# Patient Record
Sex: MALE | Race: WHITE | NOT HISPANIC OR LATINO | Employment: FULL TIME | URBAN - METROPOLITAN AREA
[De-identification: names, ages, dates, MRNs, and addresses within clinical notes are randomized per-mention and may not be internally consistent; named-entity substitution may affect disease eponyms.]

---

## 2017-09-14 ENCOUNTER — OFFICE VISIT (OUTPATIENT)
Dept: URGENT CARE | Facility: CLINIC | Age: 57
End: 2017-09-14
Payer: COMMERCIAL

## 2017-09-14 VITALS
HEART RATE: 74 BPM | TEMPERATURE: 97.5 F | BODY MASS INDEX: 21.66 KG/M2 | RESPIRATION RATE: 16 BRPM | HEIGHT: 69 IN | OXYGEN SATURATION: 96 % | WEIGHT: 146.2 LBS | DIASTOLIC BLOOD PRESSURE: 78 MMHG | SYSTOLIC BLOOD PRESSURE: 112 MMHG

## 2017-09-14 DIAGNOSIS — H92.03 OTALGIA, BILATERAL: ICD-10-CM

## 2017-09-14 DIAGNOSIS — H66.003 ACUTE SUPPURATIVE OTITIS MEDIA OF BOTH EARS WITHOUT SPONTANEOUS RUPTURE OF TYMPANIC MEMBRANES, RECURRENCE NOT SPECIFIED: ICD-10-CM

## 2017-09-14 DIAGNOSIS — H93.13 TINNITUS, BILATERAL: ICD-10-CM

## 2017-09-14 DIAGNOSIS — J01.10 SUBACUTE FRONTAL SINUSITIS: ICD-10-CM

## 2017-09-14 PROCEDURE — 99203 OFFICE O/P NEW LOW 30 MIN: CPT | Performed by: NURSE PRACTITIONER

## 2017-09-14 RX ORDER — AMOXICILLIN AND CLAVULANATE POTASSIUM 875; 125 MG/1; MG/1
1 TABLET, FILM COATED ORAL 2 TIMES DAILY
Qty: 10 TAB | Refills: 0 | Status: SHIPPED | OUTPATIENT
Start: 2017-09-14 | End: 2017-09-19

## 2017-09-14 RX ORDER — DEXLANSOPRAZOLE 60 MG/1
1 CAPSULE, DELAYED RELEASE ORAL
Refills: 11 | COMMUNITY
Start: 2017-08-27 | End: 2018-07-23 | Stop reason: SDUPTHER

## 2017-09-14 ASSESSMENT — ENCOUNTER SYMPTOMS
HEADACHES: 1
DIAPHORESIS: 0
WEAKNESS: 0
FEVER: 0
SORE THROAT: 0
PALPITATIONS: 0
SHORTNESS OF BREATH: 0
ORTHOPNEA: 0
COUGH: 0
CHILLS: 0

## 2017-09-14 NOTE — PROGRESS NOTES
"Subjective:      Deandre Barr is a 57 y.o. male who presents with Otalgia (bilateral ear pain X 2 weeks )            Patient comes in today with a 2 week history of bilateral otalgia.  He notes worse symptoms during nighttime.  Associated symptoms include a waxing and waning headache with sinus pressure in the frontal region.  No fever, chills, nasal congestion, sore throat, or cough.  No dizziness.  He has baseline tinnitus for many years.  He is not taking any medication to treat the symptoms.  He is a current smoker with approximately 45 pack year history.  He is not interested in smoking cessation.        Review of Systems   Constitutional: Negative for chills, diaphoresis, fever and malaise/fatigue.   HENT: Positive for ear pain and tinnitus. Negative for congestion, ear discharge, hearing loss and sore throat.    Respiratory: Negative for cough and shortness of breath.    Cardiovascular: Negative for chest pain, palpitations, orthopnea and leg swelling.   Skin: Negative for rash.   Neurological: Positive for headaches. Negative for weakness.     Medications, Allergies, and current problem list reviewed today in Epic     Objective:     /78   Pulse 74   Temp 36.4 °C (97.5 °F)   Resp 16   Ht 1.753 m (5' 9\")   Wt 66.3 kg (146 lb 3.2 oz)   SpO2 96%   BMI 21.59 kg/m²      Physical Exam   Constitutional: He is oriented to person, place, and time. He appears well-developed and well-nourished. No distress.   HENT:   Head: Normocephalic.   Nose: Nose normal.   Mouth/Throat: Oropharynx is clear and moist. No oropharyngeal exudate.   Patient reports generalized frontal headache with no sinus TTP.  Nares patent.  Bilateral TM effusion with milky fluid and slight bulge.  NO erythema.  No pre- or post-auricular adenopathy.  No mastoid swelling or TTP.   Eyes: Conjunctivae are normal. Pupils are equal, round, and reactive to light. Right eye exhibits no discharge. Left eye exhibits no discharge. No scleral " icterus.   Neck: Neck supple. No JVD present. No tracheal deviation present. No thyromegaly present.   Cardiovascular: Normal rate, regular rhythm and normal heart sounds.  Exam reveals no gallop and no friction rub.    No murmur heard.  Pulmonary/Chest: Effort normal and breath sounds normal. No stridor. No respiratory distress. He has no wheezes. He has no rales. He exhibits no tenderness.   Musculoskeletal: He exhibits no edema.   Lymphadenopathy:     He has no cervical adenopathy.   Neurological: He is alert and oriented to person, place, and time.   Skin: Skin is warm and dry. He is not diaphoretic. No erythema. No pallor.   Vitals reviewed.              Assessment/Plan:     1. Acute suppurative otitis media of both ears without spontaneous rupture of tympanic membranes, recurrence not specified  - amoxicillin-clavulanate (AUGMENTIN) 875-125 MG Tab; Take 1 Tab by mouth 2 times a day for 5 days.  Dispense: 10 Tab; Refill: 0    2. Subacute frontal sinusitis    3. Otalgia, bilateral    4. Tinnitus, bilateral    Take full course of antibiotics.  OTC NSAIDs or tylenol prn fever, pain.  Encouraged smoking cessation.  Maintain adequate po hydration.  RTC in 5-7 days if symptoms persist, sooner if worse.  If symptoms persist, patient may also contact me for referral to ENT.  Establish with ENT for routine health care, screening, and maintenance.  Patient verbalized understanding of and agreed with plan of care.

## 2018-01-15 ENCOUNTER — OFFICE VISIT (OUTPATIENT)
Dept: URGENT CARE | Facility: CLINIC | Age: 58
End: 2018-01-15
Payer: COMMERCIAL

## 2018-01-15 VITALS
TEMPERATURE: 98.5 F | BODY MASS INDEX: 21.62 KG/M2 | RESPIRATION RATE: 16 BRPM | SYSTOLIC BLOOD PRESSURE: 128 MMHG | OXYGEN SATURATION: 97 % | WEIGHT: 146 LBS | HEART RATE: 78 BPM | HEIGHT: 69 IN | DIASTOLIC BLOOD PRESSURE: 72 MMHG

## 2018-01-15 DIAGNOSIS — K04.7 DENTAL ABSCESS: ICD-10-CM

## 2018-01-15 PROCEDURE — 99214 OFFICE O/P EST MOD 30 MIN: CPT | Performed by: PHYSICIAN ASSISTANT

## 2018-01-15 RX ORDER — AMOXICILLIN AND CLAVULANATE POTASSIUM 875; 125 MG/1; MG/1
1 TABLET, FILM COATED ORAL 2 TIMES DAILY
Qty: 20 TAB | Refills: 0 | Status: SHIPPED | OUTPATIENT
Start: 2018-01-15 | End: 2018-03-09

## 2018-01-15 ASSESSMENT — ENCOUNTER SYMPTOMS
CARDIOVASCULAR NEGATIVE: 1
GASTROINTESTINAL NEGATIVE: 1
CHILLS: 0
RESPIRATORY NEGATIVE: 1
SINUS PRESSURE: 0
FEVER: 0

## 2018-01-15 NOTE — PROGRESS NOTES
"Subjective:      Deandre Barr is a 57 y.o. male who presents with Oral Swelling (right upper dental swelling since yesterday )            Dental Pain    This is a new problem. The current episode started yesterday. The problem occurs constantly. The problem has been gradually worsening. The pain is at a severity of 7/10. The pain is moderate. Pertinent negatives include no difficulty swallowing, facial pain, fever, sinus pressure or thermal sensitivity. He has tried nothing for the symptoms. The treatment provided no relief.       PMH:  has no past medical history on file.  MEDS:   Current Outpatient Prescriptions:   •  amoxicillin-clavulanate (AUGMENTIN) 875-125 MG Tab, Take 1 Tab by mouth 2 times a day., Disp: 20 Tab, Rfl: 0  •  DEXILANT 60 MG CAPSULE DELAYED RELEASE delayed-release capsule, Take 1 Cap by mouth every day., Disp: , Rfl: 11  ALLERGIES: No Known Allergies  SURGHX: History reviewed. No pertinent surgical history.  SOCHX:  reports that he has been smoking.  He has a 45.00 pack-year smoking history. He has quit using smokeless tobacco.  FH: family history is not on file.      Review of Systems   Constitutional: Negative for chills and fever.   HENT: Negative.  Negative for sinus pressure.    Respiratory: Negative.    Cardiovascular: Negative.    Gastrointestinal: Negative.        Medications, Allergies, and current problem list reviewed today in Epic     Objective:     /72   Pulse 78   Temp 36.9 °C (98.5 °F)   Resp 16   Ht 1.753 m (5' 9\")   Wt 66.2 kg (146 lb)   SpO2 97%   BMI 21.56 kg/m²      Physical Exam   Constitutional: He is oriented to person, place, and time. He appears well-developed and well-nourished. No distress.   HENT:   Head: Normocephalic and atraumatic.   Right Ear: External ear normal.   Left Ear: External ear normal.   Mouth/Throat: Uvula is midline, oropharynx is clear and moist and mucous membranes are normal. Oral lesions present. Dental abscesses present. No " uvula swelling or dental caries. No oropharyngeal exudate.       Eyes: Conjunctivae are normal. Right eye exhibits no discharge. Left eye exhibits no discharge.   Neck: Normal range of motion. Neck supple.   Cardiovascular: Normal rate, regular rhythm and normal heart sounds.    No murmur heard.  Pulmonary/Chest: Effort normal and breath sounds normal. No respiratory distress. He has no wheezes.   Lymphadenopathy:     He has no cervical adenopathy.   Neurological: He is alert and oriented to person, place, and time.   Skin: Skin is warm and dry. He is not diaphoretic.   Psychiatric: He has a normal mood and affect. His behavior is normal. Judgment and thought content normal.   Nursing note and vitals reviewed.              Assessment/Plan:     1. Dental abscess  amoxicillin-clavulanate (AUGMENTIN) 875-125 MG Tab     Take full course of antibiotic  Follow-up with dentistry next week  OTC meds and conservative measures as discussed  Return to clinic or go to ED if symptoms worsen or persist. Indications for ED discussed at length. Patient voices understanding. Follow-up with your primary care provider in 3-5 days. Red flags discussed.    Please note that this dictation was created using voice recognition software. I have made every reasonable attempt to correct obvious errors, but I expect that there are errors of grammar and possibly content that I did not discover before finalizing the note.

## 2018-03-09 ENCOUNTER — OFFICE VISIT (OUTPATIENT)
Dept: URGENT CARE | Facility: CLINIC | Age: 58
End: 2018-03-09
Payer: COMMERCIAL

## 2018-03-09 VITALS
WEIGHT: 152.4 LBS | HEART RATE: 86 BPM | SYSTOLIC BLOOD PRESSURE: 130 MMHG | BODY MASS INDEX: 22.57 KG/M2 | OXYGEN SATURATION: 96 % | TEMPERATURE: 98.2 F | RESPIRATION RATE: 16 BRPM | HEIGHT: 69 IN | DIASTOLIC BLOOD PRESSURE: 84 MMHG

## 2018-03-09 DIAGNOSIS — K12.2 ORAL INFECTION: ICD-10-CM

## 2018-03-09 DIAGNOSIS — K08.89 TOOTH PAIN: ICD-10-CM

## 2018-03-09 PROCEDURE — 99214 OFFICE O/P EST MOD 30 MIN: CPT | Performed by: NURSE PRACTITIONER

## 2018-03-09 RX ORDER — AMOXICILLIN AND CLAVULANATE POTASSIUM 875; 125 MG/1; MG/1
1 TABLET, FILM COATED ORAL 2 TIMES DAILY
Qty: 14 TAB | Refills: 0 | Status: SHIPPED | OUTPATIENT
Start: 2018-03-09 | End: 2018-03-16

## 2018-03-09 ASSESSMENT — ENCOUNTER SYMPTOMS
CHILLS: 0
SORE THROAT: 0
DIAPHORESIS: 0
FEVER: 0
WEAKNESS: 0

## 2018-03-09 NOTE — PROGRESS NOTES
"Subjective:      Deandre Barr is a 58 y.o. male who presents with Dental Pain (right lower dental pain X 2 days )            Patient comes in today with a 2 day history of tooth pain in the lower left molar region.  He reports he has a \"bad tooth\" at that site and knows he needs to have it pulled.  He is waiting for his girlfriend to come up from [place] to drive him after sedation.  He had a similar occurrence on the upper right jaw 2 months ago and had that tooth pulled after finishing his antibiotics.  No fever, chills, facial swelling, or purulence.          Review of Systems   Constitutional: Negative for chills, diaphoresis, fever and malaise/fatigue.   HENT: Negative for sore throat.    Neurological: Negative for weakness.     Medications, Allergies, and current problem list reviewed today in Epic     Objective:     /84   Pulse 86   Temp 36.8 °C (98.2 °F)   Resp 16   Ht 1.753 m (5' 9\")   Wt 69.1 kg (152 lb 6.4 oz)   SpO2 96%   BMI 22.51 kg/m²      Physical Exam   Constitutional: He is oriented to person, place, and time. He appears well-developed and well-nourished. No distress.   HENT:   Mouth/Throat:       Focal TTP of molar on the bottom left; tooth has noted decay at the gum line.  Very tender to touch.  No loose or broken teeth.  No gum swelling, erythema, or purulence.     Cardiovascular: Normal rate.    Pulmonary/Chest: Effort normal.   Neurological: He is alert and oriented to person, place, and time.   Skin: He is not diaphoretic.   Vitals reviewed.              Assessment/Plan:     1. Oral infection  - amoxicillin-clavulanate (AUGMENTIN) 875-125 MG Tab; Take 1 Tab by mouth 2 times a day for 7 days.  Dispense: 14 Tab; Refill: 0    2. Tooth pain    Discussed exam findings with patient.  Infection versus inflammation.  Recommended consult and care with dentist at first availability.    Take full course of antibiotics.  OTC NSAIDs or tylenol prn pain.  Maintain adequate po " hydration.  ED precautions reviewed.  Encouraged smoking cessation.  Patient verbalized understanding of and agreed with plan of care.

## 2018-07-23 ENCOUNTER — OFFICE VISIT (OUTPATIENT)
Dept: MEDICAL GROUP | Facility: PHYSICIAN GROUP | Age: 58
End: 2018-07-23
Payer: COMMERCIAL

## 2018-07-23 VITALS
OXYGEN SATURATION: 96 % | DIASTOLIC BLOOD PRESSURE: 62 MMHG | HEIGHT: 69 IN | WEIGHT: 149 LBS | TEMPERATURE: 98.6 F | BODY MASS INDEX: 22.07 KG/M2 | RESPIRATION RATE: 16 BRPM | SYSTOLIC BLOOD PRESSURE: 112 MMHG | HEART RATE: 80 BPM

## 2018-07-23 DIAGNOSIS — Z87.19 H/O GASTROESOPHAGEAL REFLUX (GERD): ICD-10-CM

## 2018-07-23 PROCEDURE — 99212 OFFICE O/P EST SF 10 MIN: CPT | Performed by: NURSE PRACTITIONER

## 2018-07-23 RX ORDER — DEXLANSOPRAZOLE 60 MG/1
1 CAPSULE, DELAYED RELEASE ORAL
Qty: 90 CAP | Refills: 0 | Status: SHIPPED | OUTPATIENT
Start: 2018-07-23 | End: 2018-10-23 | Stop reason: SDUPTHER

## 2018-07-23 ASSESSMENT — PATIENT HEALTH QUESTIONNAIRE - PHQ9: CLINICAL INTERPRETATION OF PHQ2 SCORE: 0

## 2018-07-24 NOTE — PROGRESS NOTES
"Chief Complaint   Patient presents with   • Gastrophageal Reflux     refill dexilant         This is a 58 y.o.male patient that presents today with the following: Refill on Dexilant until he can be established with a new provider    H/O gastroesophageal reflux (GERD)  Pt has history of GERD, this is stable and well controlled with Dexilant 60 mg daily. He tolerates medication well. He states that he tries to avoid aggravating foods and activities. He will be establishing care with new provider, but in the interim, needs a refill. This was called in for him, advised to take on empty stomach      No visits with results within 1 Month(s) from this visit.   Latest known visit with results is:   No results found for any previous visit.         clinical course has been stable    History reviewed. No pertinent past medical history.    History reviewed. No pertinent surgical history.    History reviewed. No pertinent family history.    Patient has no known allergies.    Current Outpatient Prescriptions Ordered in [employer]   Medication Sig Dispense Refill   • DEXILANT 60 MG CAPSULE DELAYED RELEASE delayed-release capsule Take 1 Cap by mouth every day. 90 Cap 0     No current Epic-ordered facility-administered medications on file.        Constitutional ROS: No unexpected change in weight, No weakness, No unexplained fevers, sweats, or chills  Pulmonary ROS: No chronic cough, sputum, or hemoptysis, No shortness of breath, No recent change in breathing, Positive for smoker   Cardiovascular ROS: No chest pain, No edema, No palpitations  Gastrointestinal ROS: Positive per HPI  Musculoskeletal/Extremities ROS: No clubbing, No peripheral edema, No pain, redness or swelling on the joints  Neurologic ROS: Normal development, No seizures, No weakness    Physical exam:  /62   Pulse 80   Temp 37 °C (98.6 °F)   Resp 16   Ht 1.753 m (5' 9\")   Wt 67.6 kg (149 lb)   SpO2 96%   BMI 22.00 kg/m²   General Appearance: Middle-aged male, " alert, no distress, well-nourished, well-groomed  Skin: Skin color, texture, turgor normal. No rashes or lesions.  Lungs: negative findings: normal respiratory rate and rhythm, lungs clear to auscultation  Heart: negative. RRR without murmur, gallop, or rubs.  No ectopy.  Abdomen: Abdomen soft, non-tender. BS normal. No masses,  No organomegaly  Musculoskeletal: negative findings: ROM of all joints is normal, no evidence of muscle atrophy, no deformities present  Neurologic: intact, oriented, CN II through XII grossly intact    Medical decision making/discussion: Patient has been referred to gastroenterology per his request and his Dexilant has been refilled and he is to take this as prescribed, first thing in the morning on an empty stomach.  He is to follow-up as needed.    Deandre was seen today for gastrophageal reflux.    Diagnoses and all orders for this visit:    H/O gastroesophageal reflux (GERD)  -     DEXILANT 60 MG CAPSULE DELAYED RELEASE delayed-release capsule; Take 1 Cap by mouth every day.  -     REFERRAL TO GASTROENTEROLOGY          Please note that this dictation was created using voice recognition software. I have made every reasonable attempt to correct obvious errors, but I expect that there are errors of grammar and possibly content that I did not discover before finalizing the note.

## 2018-07-24 NOTE — ASSESSMENT & PLAN NOTE
Pt has history of GERD, this is stable and well controlled with Dexilant 60 mg daily. He tolerates medication well. He states that he tries to avoid aggravating foods and activities. He will be establishing care with new provider, but in the interim, needs a refill. This was called in for him, advised to take on empty stomach

## 2018-10-23 DIAGNOSIS — Z87.19 H/O GASTROESOPHAGEAL REFLUX (GERD): ICD-10-CM

## 2018-10-23 RX ORDER — DEXLANSOPRAZOLE 60 MG/1
CAPSULE, DELAYED RELEASE ORAL
Qty: 90 CAP | Refills: 2 | Status: SHIPPED | OUTPATIENT
Start: 2018-10-23 | End: 2019-06-12 | Stop reason: SDUPTHER

## 2019-03-13 ENCOUNTER — OFFICE VISIT (OUTPATIENT)
Dept: URGENT CARE | Facility: PHYSICIAN GROUP | Age: 59
End: 2019-03-13
Payer: COMMERCIAL

## 2019-03-13 VITALS
DIASTOLIC BLOOD PRESSURE: 80 MMHG | OXYGEN SATURATION: 97 % | SYSTOLIC BLOOD PRESSURE: 134 MMHG | RESPIRATION RATE: 20 BRPM | TEMPERATURE: 98.4 F | HEART RATE: 80 BPM | WEIGHT: 156 LBS | HEIGHT: 69 IN | BODY MASS INDEX: 23.11 KG/M2

## 2019-03-13 DIAGNOSIS — K04.7 DENTAL INFECTION: ICD-10-CM

## 2019-03-13 PROCEDURE — 99214 OFFICE O/P EST MOD 30 MIN: CPT | Performed by: FAMILY MEDICINE

## 2019-03-13 RX ORDER — AMOXICILLIN AND CLAVULANATE POTASSIUM 875; 125 MG/1; MG/1
TABLET, FILM COATED ORAL
Qty: 20 TAB | Refills: 0 | Status: SHIPPED | OUTPATIENT
Start: 2019-03-13 | End: 2019-09-10

## 2019-03-14 NOTE — PROGRESS NOTES
Chief Complaint:    Chief Complaint   Patient presents with   • Dental Injury     left side bottom tooth pain/ swelling/infection x1day        History of Present Illness:    This is a new problem. He has pain left lower tooth x 1 day. He has had problem with that tooth in past and was supposed to get it fixed, but never did, and it flared today. Augmentin has worked/tolerated for similar previous problem in other teeth that he did get fixed.      Review of Systems:    Constitutional: Negative for fever, chills, and diaphoresis.   Eyes: Negative for change in vision, photophobia, pain, redness, and discharge.  ENT: See HPI.  Respiratory: Negative for cough, hemoptysis, sputum production, shortness of breath, wheezing, and stridor.    Cardiovascular: Negative for chest pain, palpitations, orthopnea, claudication, leg swelling, and PND.   Gastrointestinal: Negative for abdominal pain, nausea, vomiting, diarrhea, constipation, blood in stool, and melena.   Genitourinary: Negative for dysuria, urinary urgency, urinary frequency, hematuria, and flank pain.   Musculoskeletal: Negative for myalgias, joint pain, neck pain, and back pain.   Skin: Negative for rash and itching.   Neurological: Negative for dizziness, tingling, tremors, sensory change, speech change, focal weakness, seizures, loss of consciousness, and headaches.   Endo: Negative for polydipsia.   Heme: Does not bruise/bleed easily.   Psychiatric/Behavioral: Negative for depression, suicidal ideas, hallucinations, memory loss and substance abuse. The patient is not nervous/anxious and does not have insomnia.        Past Medical History:    History reviewed. No pertinent past medical history.    Past Surgical History:    History reviewed. No pertinent surgical history.    Social History:    Social History     Social History   • Marital status: Single     Spouse name: N/A   • Number of children: N/A   • Years of education: N/A     Occupational History   • Not on  "file.     Social History Main Topics   • Smoking status: Current Every Day Smoker     Packs/day: 1.50     Years: 30.00   • Smokeless tobacco: Former User   • Alcohol use Not on file   • Drug use: Unknown   • Sexual activity: Not on file     Other Topics Concern   • Not on file     Social History Narrative   • No narrative on file     Family History:    History reviewed. No pertinent family history.    Medications:    Current Outpatient Prescriptions on File Prior to Visit   Medication Sig Dispense Refill   • DEXILANT 60 MG CAPSULE DELAYED RELEASE delayed-release capsule TAKE 1 CAPSULE BY MOUTH EVERY DAY 90 Cap 2     No current facility-administered medications on file prior to visit.      Allergies:    No Known Allergies      Vitals:    Vitals:    03/13/19 1732   BP: 134/80   BP Location: Left arm   Patient Position: Sitting   BP Cuff Size: Small adult   Pulse: 80   Resp: 20   Temp: 36.9 °C (98.4 °F)   SpO2: 97%   Weight: 70.8 kg (156 lb)   Height: 1.753 m (5' 9\")       Physical Exam:    Constitutional: Vital signs reviewed. Appears well-developed and well-nourished. No acute distress.   Eyes: Sclera white, conjunctivae clear.   ENT: Left lower tooth is capped and tender to palpation. External ears normal. Hearing normal. Lips are normal. Oral mucosa pink and moist. Posterior pharynx: WNL.  Neck: Neck supple.   Pulmonary/Chest: Respirations non-labored.   Lymph: Cervical nodes without tenderness or enlargement.  Musculoskeletal: Normal gait. Normal range of motion. No muscular atrophy or weakness.  Neurological: Alert and oriented to person, place, and time. Muscle tone normal. Coordination normal.   Skin: No rashes or lesions. Warm, dry, normal turgor.  Psychiatric: Normal mood and affect. Behavior is normal. Judgment and thought content normal.       Assessment / Plan:    1. Dental infection  - amoxicillin-clavulanate (AUGMENTIN) 875-125 MG Tab; 1 TAB TWICE A DAY X 10 DAYS. TAKE WITH FOOD.  Dispense: 20 Tab; " Refill: 0      Discussed with him DDX, management options, and risks, benefits, and alternatives to treatment plan agreed upon.    Agreeable to medication prescribed.    Advised to see Dentist for definitive treatment.    Discussed expected course of duration, time for improvement, and to seek follow-up in Emergency Room, urgent care, or with PCP if getting worse at any time or not improving within expected time frame.

## 2019-06-12 DIAGNOSIS — Z87.19 H/O GASTROESOPHAGEAL REFLUX (GERD): ICD-10-CM

## 2019-06-14 RX ORDER — DEXLANSOPRAZOLE 60 MG/1
CAPSULE, DELAYED RELEASE ORAL
Qty: 90 CAP | Refills: 1 | Status: SHIPPED | OUTPATIENT
Start: 2019-06-14 | End: 2019-10-30 | Stop reason: SDUPTHER

## 2019-09-10 ENCOUNTER — TELEPHONE (OUTPATIENT)
Dept: SCHEDULING | Facility: IMAGING CENTER | Age: 59
End: 2019-09-10

## 2019-09-10 ENCOUNTER — HOSPITAL ENCOUNTER (EMERGENCY)
Facility: MEDICAL CENTER | Age: 59
End: 2019-09-10
Attending: EMERGENCY MEDICINE
Payer: COMMERCIAL

## 2019-09-10 VITALS
WEIGHT: 151.9 LBS | TEMPERATURE: 97.9 F | HEIGHT: 69 IN | OXYGEN SATURATION: 100 % | SYSTOLIC BLOOD PRESSURE: 162 MMHG | RESPIRATION RATE: 16 BRPM | DIASTOLIC BLOOD PRESSURE: 89 MMHG | BODY MASS INDEX: 22.5 KG/M2 | HEART RATE: 96 BPM

## 2019-09-10 DIAGNOSIS — R10.30 LOWER ABDOMINAL PAIN: ICD-10-CM

## 2019-09-10 LAB
ALBUMIN SERPL BCP-MCNC: 3.9 G/DL (ref 3.2–4.9)
ALBUMIN/GLOB SERPL: 1.3 G/DL
ALP SERPL-CCNC: 73 U/L (ref 30–99)
ALT SERPL-CCNC: 17 U/L (ref 2–50)
ANION GAP SERPL CALC-SCNC: 6 MMOL/L (ref 0–11.9)
APPEARANCE UR: CLEAR
AST SERPL-CCNC: 18 U/L (ref 12–45)
BASOPHILS # BLD AUTO: 0.4 % (ref 0–1.8)
BASOPHILS # BLD: 0.05 K/UL (ref 0–0.12)
BILIRUB SERPL-MCNC: 1 MG/DL (ref 0.1–1.5)
BILIRUB UR QL STRIP.AUTO: NEGATIVE
BUN SERPL-MCNC: 14 MG/DL (ref 8–22)
CALCIUM SERPL-MCNC: 8.8 MG/DL (ref 8.5–10.5)
CHLORIDE SERPL-SCNC: 107 MMOL/L (ref 96–112)
CO2 SERPL-SCNC: 25 MMOL/L (ref 20–33)
COLOR UR: YELLOW
CREAT SERPL-MCNC: 0.8 MG/DL (ref 0.5–1.4)
EOSINOPHIL # BLD AUTO: 0.09 K/UL (ref 0–0.51)
EOSINOPHIL NFR BLD: 0.7 % (ref 0–6.9)
ERYTHROCYTE [DISTWIDTH] IN BLOOD BY AUTOMATED COUNT: 43.9 FL (ref 35.9–50)
GLOBULIN SER CALC-MCNC: 2.9 G/DL (ref 1.9–3.5)
GLUCOSE SERPL-MCNC: 90 MG/DL (ref 65–99)
GLUCOSE UR STRIP.AUTO-MCNC: NEGATIVE MG/DL
HCT VFR BLD AUTO: 48 % (ref 42–52)
HGB BLD-MCNC: 16.3 G/DL (ref 14–18)
IMM GRANULOCYTES # BLD AUTO: 0.06 K/UL (ref 0–0.11)
IMM GRANULOCYTES NFR BLD AUTO: 0.5 % (ref 0–0.9)
KETONES UR STRIP.AUTO-MCNC: NEGATIVE MG/DL
LEUKOCYTE ESTERASE UR QL STRIP.AUTO: NEGATIVE
LIPASE SERPL-CCNC: 27 U/L (ref 11–82)
LYMPHOCYTES # BLD AUTO: 1.8 K/UL (ref 1–4.8)
LYMPHOCYTES NFR BLD: 14.5 % (ref 22–41)
MCH RBC QN AUTO: 33.3 PG (ref 27–33)
MCHC RBC AUTO-ENTMCNC: 34 G/DL (ref 33.7–35.3)
MCV RBC AUTO: 98 FL (ref 81.4–97.8)
MICRO URNS: NORMAL
MONOCYTES # BLD AUTO: 0.87 K/UL (ref 0–0.85)
MONOCYTES NFR BLD AUTO: 7 % (ref 0–13.4)
NEUTROPHILS # BLD AUTO: 9.53 K/UL (ref 1.82–7.42)
NEUTROPHILS NFR BLD: 76.9 % (ref 44–72)
NITRITE UR QL STRIP.AUTO: NEGATIVE
NRBC # BLD AUTO: 0 K/UL
NRBC BLD-RTO: 0 /100 WBC
PH UR STRIP.AUTO: 5.5 [PH] (ref 5–8)
PLATELET # BLD AUTO: 280 K/UL (ref 164–446)
PMV BLD AUTO: 9.3 FL (ref 9–12.9)
POTASSIUM SERPL-SCNC: 3.8 MMOL/L (ref 3.6–5.5)
PROT SERPL-MCNC: 6.8 G/DL (ref 6–8.2)
PROT UR QL STRIP: NEGATIVE MG/DL
RBC # BLD AUTO: 4.9 M/UL (ref 4.7–6.1)
RBC UR QL AUTO: NEGATIVE
SODIUM SERPL-SCNC: 138 MMOL/L (ref 135–145)
SP GR UR STRIP.AUTO: 1.01
UROBILINOGEN UR STRIP.AUTO-MCNC: 0.2 MG/DL
WBC # BLD AUTO: 12.4 K/UL (ref 4.8–10.8)

## 2019-09-10 PROCEDURE — 85025 COMPLETE CBC W/AUTO DIFF WBC: CPT

## 2019-09-10 PROCEDURE — 83690 ASSAY OF LIPASE: CPT

## 2019-09-10 PROCEDURE — 99284 EMERGENCY DEPT VISIT MOD MDM: CPT

## 2019-09-10 PROCEDURE — 81003 URINALYSIS AUTO W/O SCOPE: CPT

## 2019-09-10 PROCEDURE — 80053 COMPREHEN METABOLIC PANEL: CPT

## 2019-09-10 ASSESSMENT — ENCOUNTER SYMPTOMS
MUSCULOSKELETAL NEGATIVE: 1
EYES NEGATIVE: 1
NAUSEA: 0
VOMITING: 0
SHORTNESS OF BREATH: 0
NEUROLOGICAL NEGATIVE: 1
ABDOMINAL PAIN: 1
FEVER: 0
PSYCHIATRIC NEGATIVE: 1

## 2019-09-10 NOTE — ED TRIAGE NOTES
".  Chief Complaint   Patient presents with   • Abdominal Pain     lower abdominal pain     .BP (!) 162/89   Pulse 96   Temp 36.6 °C (97.9 °F) (Temporal)   Resp 16   Ht 1.753 m (5' 9\")   Wt 68.9 kg (151 lb 14.4 oz)   SpO2 100%   BMI 22.43 kg/m²     Ambulatory to triage with above complaints on waking this morning, denies N/V, educated on triage process, placed in lobby, told to inform staff of any changes in condition.    "

## 2019-09-10 NOTE — TELEPHONE ENCOUNTER
Phone Number Called: [phone] (home)     Call outcome: spoke to patient regarding message below    Message: Patient will be heading into ER not UC to be evaluated. Patient will schedule follow visit with PCP after he is evaluated. Thank you.

## 2019-09-10 NOTE — ED PROVIDER NOTES
"ED Provider Note    Scribed for Cadence Gonzalez D.O. by Raymon Prado. 9/10/2019, 11:33 AM.    Primary care provider: TABATHA Montero  Means of arrival: Walk-In  History obtained from: Patient  History limited by: None    CHIEF COMPLAINT  Chief Complaint   Patient presents with   • Abdominal Pain     lower abdominal pain       HPI  Deandre Barr is a 59 y.o. male who presents to the Emergency Department for evaluation of abdominal pain, onset this morning. This morning he states he was in severe abdominal pain, his pain has diminished since then stating that he is in no pain at present, but adds that it felt very similar to when he had kidney stones in the past. 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 Additional to this he notes his abdomen has been \"feeling weird\" for the past couple weeks. He notes associated \"yellow bile liquid stool\".  No urinary changes.  Previously when he has had kidney stones, he has had gross hematuria which she is not currently experiencing. He denies fever, nausea, emesis, testicular pain, hematuria, cough, or shortness of breath. For the past 15 years he notes eating has caused him discomfort.  And that he has had chronic GI symptoms.  He had extensive work-up in the past including colonoscopy but never been given a definitive diagnosis.  It has been sometime since he has had any GI work-up and he does not have an established care provider in this community.  The patient denies any abdominal surgery. At bedside he denies hypertension or diabetes. He endorses daily drinking and cigarette smoking.     REVIEW OF SYSTEMS  Review of Systems   Constitutional: Negative for fever.   HENT: Negative.    Eyes: Negative.    Respiratory: Negative for shortness of breath.    Cardiovascular: Negative for chest pain.   Gastrointestinal: Positive for abdominal pain. Negative for nausea and vomiting.   Genitourinary: Negative for hematuria.        " "Negative Testicular Pain   Musculoskeletal: Negative.    Skin: Negative.    Neurological: Negative.    Endo/Heme/Allergies: Negative.    Psychiatric/Behavioral: Negative.    All other systems reviewed and are negative.      PAST MEDICAL HISTORY   Chronic GI symptoms.  No other significant past medical history.    SURGICAL HISTORY  patient denies any surgical history    SOCIAL HISTORY  Social History     Tobacco Use   • Smoking status: Current Every Day Smoker     Packs/day: 1.50     Years: 30.00     Pack years: 45.00   • Smokeless tobacco: Former User   Substance Use Topics   • Alcohol use: Yes   • Drug use: Never      Social History     Substance and Sexual Activity   Drug Use Never       FAMILY HISTORY  History reviewed. No pertinent family history.    CURRENT MEDICATIONS  Home Medications     Reviewed by Tonja Nash R.N. (Registered Nurse) on 09/10/19 at 1044  Med List Status: Complete   Medication Last Dose Status   DEXILANT 60 MG CAPSULE DELAYED RELEASE delayed-release capsule 9/10/2019 Active                ALLERGIES  No Known Allergies    PHYSICAL EXAM  VITAL SIGNS: BP (!) 162/89   Pulse 96   Temp 36.6 °C (97.9 °F) (Temporal)   Resp 16   Ht 1.753 m (5' 9\")   Wt 68.9 kg (151 lb 14.4 oz)   SpO2 100%   BMI 22.43 kg/m²   Vitals reviewed.  Constitutional: Patient is oriented to person, place, and time. Appears well-developed and well-nourished. No distress.    Head: Normocephalic and atraumatic.   Ears: Normal external ears bilaterally.   Mouth/Throat: Oropharynx is clear and moist,   Eyes: Conjunctivae are normal. Pupils are equal, round, and reactive to light.   Cardiovascular: Normal rate, regular rhythm and normal heart sounds.  Pulmonary/Chest: Effort normal and breath sounds normal. No respiratory distress, no wheezes, rhonchi, or rales.  Abdominal: Soft. Bowel sounds are normal. There is no tenderness, rebound or guarding, or peritoneal signs, no masses. No CVA tenderness. No testicular " pain.  Musculoskeletal: No edema and no tenderness.   Lymphadenopathy: No inguinal adenopathy.   Neurological: No focal deficits.   Skin: Skin is warm and dry. No erythema. No pallor.   Psychiatric: Patient has a normal mood and affect.     LABS  Results for orders placed or performed during the hospital encounter of 09/10/19   CBC WITH DIFFERENTIAL   Result Value Ref Range    WBC 12.4 (H) 4.8 - 10.8 K/uL    RBC 4.90 4.70 - 6.10 M/uL    Hemoglobin 16.3 14.0 - 18.0 g/dL    Hematocrit 48.0 42.0 - 52.0 %    MCV 98.0 (H) 81.4 - 97.8 fL    MCH 33.3 (H) 27.0 - 33.0 pg    MCHC 34.0 33.7 - 35.3 g/dL    RDW 43.9 35.9 - 50.0 fL    Platelet Count 280 164 - 446 K/uL    MPV 9.3 9.0 - 12.9 fL    Neutrophils-Polys 76.90 (H) 44.00 - 72.00 %    Lymphocytes 14.50 (L) 22.00 - 41.00 %    Monocytes 7.00 0.00 - 13.40 %    Eosinophils 0.70 0.00 - 6.90 %    Basophils 0.40 0.00 - 1.80 %    Immature Granulocytes 0.50 0.00 - 0.90 %    Nucleated RBC 0.00 /100 WBC    Neutrophils (Absolute) 9.53 (H) 1.82 - 7.42 K/uL    Lymphs (Absolute) 1.80 1.00 - 4.80 K/uL    Monos (Absolute) 0.87 (H) 0.00 - 0.85 K/uL    Eos (Absolute) 0.09 0.00 - 0.51 K/uL    Baso (Absolute) 0.05 0.00 - 0.12 K/uL    Immature Granulocytes (abs) 0.06 0.00 - 0.11 K/uL    NRBC (Absolute) 0.00 K/uL   COMP METABOLIC PANEL   Result Value Ref Range    Sodium 138 135 - 145 mmol/L    Potassium 3.8 3.6 - 5.5 mmol/L    Chloride 107 96 - 112 mmol/L    Co2 25 20 - 33 mmol/L    Anion Gap 6.0 0.0 - 11.9    Glucose 90 65 - 99 mg/dL    Bun 14 8 - 22 mg/dL    Creatinine 0.80 0.50 - 1.40 mg/dL    Calcium 8.8 8.5 - 10.5 mg/dL    AST(SGOT) 18 12 - 45 U/L    ALT(SGPT) 17 2 - 50 U/L    Alkaline Phosphatase 73 30 - 99 U/L    Total Bilirubin 1.0 0.1 - 1.5 mg/dL    Albumin 3.9 3.2 - 4.9 g/dL    Total Protein 6.8 6.0 - 8.2 g/dL    Globulin 2.9 1.9 - 3.5 g/dL    A-G Ratio 1.3 g/dL   LIPASE   Result Value Ref Range    Lipase 27 11 - 82 U/L   URINALYSIS,CULTURE IF INDICATED   Result Value Ref Range     Color Yellow     Character Clear     Specific Gravity 1.011 <1.035    Ph 5.5 5.0 - 8.0    Glucose Negative Negative mg/dL    Ketones Negative Negative mg/dL    Protein Negative Negative mg/dL    Bilirubin Negative Negative    Urobilinogen, Urine 0.2 Negative    Nitrite Negative Negative    Leukocyte Esterase Negative Negative    Occult Blood Negative Negative    Micro Urine Req see below    ESTIMATED GFR   Result Value Ref Range    GFR If African American >60 >60 mL/min/1.73 m 2    GFR If Non African American >60 >60 mL/min/1.73 m 2       All labs reviewed by me.    COURSE & MEDICAL DECISION MAKING  Pertinent Labs & Imaging studies reviewed. (See chart for details)    Obtained and reviewed past medical records from which indicated the patient has no other ER visits, but has numerous outpatient visits, many for dental pain.    11:33 AM - Patient seen and examined at bedside.  Labs have been drawn and IV started per nursing protocols.  We discussed the plan of care that includes evaluation of his metabolic function and evaluation of his urine for possible infection. Ordered CBC w/, CMP, UA culture, and Lipase to evaluate his symptoms.  Would hold off on imaging at this time, as the patient is asymptomatic.  The differential diagnoses include but are not limited to: Gallbladder disease, biliary colic vs. Pancreatitis vs. IBS vs. Food intolerance      11:54 AM - The patient's labs have been returned and reviewed.  White blood cell count is slightly elevated.  Chemistry is entirely normal.  Lipase and urine analysis are normal.  Patient remains afebrile.    12:36 PM. Patient was reevaluated at bedside. Discussed lab results with the patient and informed them of the generally normal findings.  Patient does have a slightly elevated white blood cell count, its unclear the etiology of this.  However, at this time, he is pain-free, afebrile.  I do not see indication for imaging at this time.  Patient is agreeable with this  plan of care.  I have given him strict return precautions, if his symptoms return.  Given his ongoing chronic, intermittent GI symptoms, I recommended to the patient to seek continued follow-up.     The patient will return for new or worsening symptoms and is stable at the time of discharge.    The patient is referred to a primary physician for blood pressure management, diabetic screening, and for all other preventative health concerns.    DISPOSITION:  Patient will be discharged home in stable condition.    FOLLOW UP:  [facility], Emergency Dept  [address] [facility] [address]  [phone]    If symptoms worsen    Sanjuana Portillo M.D.  [address] [facility] [address]  [facility] [address]  [phone]    Schedule an appointment as soon as possible for a visit       TABATHA Montero  [address] [place] [address]  [phone]          [address] [address]  [phone]  Schedule an appointment as soon as possible for a visit         OUTPATIENT MEDICATIONS:  Discharge Medication List as of 9/10/2019 12:50 PM            FINAL IMPRESSION  1. Lower abdominal pain    By history, resolved in the ED     IRaymon (Chapis), am scribing for, and in the presence of, Cadence Gonzalez D.O..    Electronically signed by: Raymon Prado (Chapis), 9/10/2019    Cadence MADRIGAL D.O. personally performed the services described in this documentation, as scribed by Raymon Prado in my presence, and it is both accurate and complete. C    The note accurately reflects work and decisions made by me.  Cadence Gonzalez  9/10/2019  6:00 PM

## 2019-10-14 ENCOUNTER — OFFICE VISIT (OUTPATIENT)
Dept: URGENT CARE | Facility: CLINIC | Age: 59
End: 2019-10-14
Payer: COMMERCIAL

## 2019-10-14 VITALS
HEIGHT: 69 IN | BODY MASS INDEX: 22.66 KG/M2 | DIASTOLIC BLOOD PRESSURE: 70 MMHG | OXYGEN SATURATION: 95 % | WEIGHT: 153 LBS | TEMPERATURE: 98 F | HEART RATE: 88 BPM | RESPIRATION RATE: 16 BRPM | SYSTOLIC BLOOD PRESSURE: 110 MMHG

## 2019-10-14 DIAGNOSIS — K64.9 HEMORRHOIDS, UNSPECIFIED HEMORRHOID TYPE: ICD-10-CM

## 2019-10-14 DIAGNOSIS — Z76.0 MEDICATION REFILL: ICD-10-CM

## 2019-10-14 PROCEDURE — 99214 OFFICE O/P EST MOD 30 MIN: CPT | Performed by: PHYSICIAN ASSISTANT

## 2019-10-14 RX ORDER — IBUPROFEN 800 MG/1
TABLET ORAL
COMMUNITY
End: 2020-08-25

## 2019-10-14 ASSESSMENT — ENCOUNTER SYMPTOMS
ABDOMINAL PAIN: 0
NAUSEA: 0
MYALGIAS: 0
EYE REDNESS: 0
EYE DISCHARGE: 0
FEVER: 0
COUGH: 0
SHORTNESS OF BREATH: 0
VOMITING: 0
DIARRHEA: 0
HEADACHES: 0
SORE THROAT: 0

## 2019-10-14 NOTE — PROGRESS NOTES
Subjective:      Deandre Barr is a 59 y.o. male who presents with Medication Refill (Pt states he needs a refill for medications used for hemorrhoids)        HPI  This is a new problem.   The patient presents to clinic requesting a medication refill. The patient is currently out of his hemorrhoid cream. The patient is currently experiencing symptoms of hemorrhoids x 2 days. The patient reports increasing pain with bowel movements. The patient denies rectal bleeding. He also denies abdominal pain and fever. The patient hasn't taken anything for his symptoms, as he is out of his previously prescribed hemorrhoid cream.    PMH:  has no past medical history on file.  MEDS:   Current Outpatient Medications:   •  hydrocortisone-pramoxine (ANALPRAM-HC) 2.5-1 % rectal cream, hydrocortisone-pramoxine 2.5 %-1 % rectal cream  APPLY TO RECTUM THREE TIMES A DAY AS NEEDED, Disp: , Rfl:   •  ibuprofen (MOTRIN) 800 MG Tab, ibuprofen 800 mg tablet  TAKE 1 TABLET BY MOUTH TWICE A DAY, Disp: , Rfl:   •  DEXILANT 60 MG CAPSULE DELAYED RELEASE delayed-release capsule, TAKE 1 CAPSULE BY MOUTH EVERY DAY, Disp: 90 Cap, Rfl: 1  ALLERGIES: No Known Allergies  SURGHX: History reviewed. No pertinent surgical history.  SOCHX:  reports that he has been smoking. He has a 45.00 pack-year smoking history. He has quit using smokeless tobacco. He reports that he drinks alcohol. He reports that he does not use drugs.  FH: Family history was reviewed, no pertinent findings to report       Review of Systems   Constitutional: Negative for fever.   HENT: Negative for congestion, ear pain and sore throat.    Eyes: Negative for discharge and redness.   Respiratory: Negative for cough and shortness of breath.    Cardiovascular: Negative for chest pain and leg swelling.   Gastrointestinal: Negative for abdominal pain, diarrhea, nausea and vomiting.   Musculoskeletal: Negative for myalgias.   Skin: Negative for rash.   Neurological: Negative for headaches.  "         Objective:     /70   Pulse 88   Temp 36.7 °C (98 °F) (Temporal)   Resp 16   Ht 1.753 m (5' 9\")   Wt 69.4 kg (153 lb)   SpO2 95%   BMI 22.59 kg/m²      Physical Exam   Constitutional: He is oriented to person, place, and time. He appears well-developed and well-nourished. No distress.   HENT:   Head: Normocephalic and atraumatic.   Right Ear: External ear normal.   Left Ear: External ear normal.   Nose: Nose normal.   Eyes: Conjunctivae and EOM are normal.   Neck: Normal range of motion. Neck supple.   Cardiovascular: Normal rate.   Pulmonary/Chest: Effort normal.   Genitourinary:   Genitourinary Comments:   The patient declined a rectal exam today in clinic.   Musculoskeletal: Normal range of motion.   Moves all 4 extremities.    Neurological: He is alert and oriented to person, place, and time.   Skin: Skin is warm and dry.               Assessment/Plan:     1. Medication refill  - hydrocortisone-pramoxine (ANALPRAM-HC) 2.5-1 % rectal cream; Insert 1 Applicator in rectum 3 times a day as needed.  Dispense: 1 Tube; Refill: 0    2. Hemorrhoids, unspecified hemorrhoid type    Differential diagnoses, supportive care, and indications for immediate follow-up discussed with patient.   Instructed to return to clinic or nearest emergency department for any change in condition, further concerns, or worsening of symptoms.    Drink plenty of fluids  High-fiber diet  Follow-up with PCP   AVS printed  Return to clinic or go to the ED if symptoms worsen or fail to improve, or if patient should develop worsening/increasing/persistent hemorrhoids, rectal bleeding, rectal pain, abdominal pain, nausea/vomiting, constipation, diarrhea, fever/chills, and/or any concerning symptoms.     Discussed plan with the patient, and he agrees to the above.      ADDENDUM:   The patient called requesting his prescription to be re-sent to another pharmacy. The patient's prescription was re-sent to the requested pharmacy.   "

## 2019-10-14 NOTE — PATIENT INSTRUCTIONS
Hemorrhoids  Introduction  Hemorrhoids are swollen veins in and around the rectum or anus. Hemorrhoids can cause pain, itching, or bleeding. Most of the time, they do not cause serious problems. They usually get better with diet changes, lifestyle changes, and other home treatments.  Follow these instructions at home:  Eating and drinking  · Eat foods that have fiber, such as whole grains, beans, nuts, fruits, and vegetables. Ask your doctor about taking products that have added fiber (fiber supplements).  · Drink enough fluid to keep your pee (urine) clear or pale yellow.  For Pain and Swelling  · Take a warm-water bath (sitz bath) for 20 minutes to ease pain. Do this 3-4 times a day.  · If directed, put ice on the painful area. It may be helpful to use ice between your warm baths.  ¨ Put ice in a plastic bag.  ¨ Place a towel between your skin and the bag.  ¨ Leave the ice on for 20 minutes, 2-3 times a day.  General instructions  · Take over-the-counter and prescription medicines only as told by your doctor.  ¨ Medicated creams and medicines that are inserted into the anus (suppositories) may be used or applied as told.  · Exercise often.  · Go to the bathroom when you have the urge to poop (to have a bowel movement). Do not wait.  · Avoid pushing too hard (straining) when you poop.  · Keep the butt area dry and clean. Use wet toilet paper or moist paper towels.  · Do not sit on the toilet for a long time.  Contact a doctor if:  · You have any of these:  ¨ Pain and swelling that do not get better with treatment or medicine.  ¨ Bleeding that will not stop.  ¨ Trouble pooping or you cannot poop.  ¨ Pain or swelling outside the area of the hemorrhoids.  This information is not intended to replace advice given to you by your health care provider. Make sure you discuss any questions you have with your health care provider.  Document Released: 09/26/2009 Document Revised: 05/25/2017 Document Reviewed: 08/31/2016  ©  2017 [employer]vier

## 2020-02-13 ENCOUNTER — OFFICE VISIT (OUTPATIENT)
Dept: MEDICAL GROUP | Facility: PHYSICIAN GROUP | Age: 60
End: 2020-02-13
Payer: COMMERCIAL

## 2020-02-13 VITALS
HEART RATE: 88 BPM | RESPIRATION RATE: 16 BRPM | OXYGEN SATURATION: 98 % | DIASTOLIC BLOOD PRESSURE: 76 MMHG | WEIGHT: 156 LBS | HEIGHT: 69 IN | SYSTOLIC BLOOD PRESSURE: 136 MMHG | BODY MASS INDEX: 23.11 KG/M2 | TEMPERATURE: 98.8 F

## 2020-02-13 DIAGNOSIS — K59.1 FUNCTIONAL DIARRHEA: ICD-10-CM

## 2020-02-13 DIAGNOSIS — Z12.11 SCREENING FOR COLORECTAL CANCER: ICD-10-CM

## 2020-02-13 DIAGNOSIS — Z76.0 MEDICATION REFILL: ICD-10-CM

## 2020-02-13 DIAGNOSIS — Z12.12 SCREENING FOR COLORECTAL CANCER: ICD-10-CM

## 2020-02-13 DIAGNOSIS — Z86.19 HISTORY OF HEPATITIS C: ICD-10-CM

## 2020-02-13 DIAGNOSIS — K58.0 IRRITABLE BOWEL SYNDROME WITH DIARRHEA: ICD-10-CM

## 2020-02-13 DIAGNOSIS — K64.9 HEMORRHOIDS, UNSPECIFIED HEMORRHOID TYPE: ICD-10-CM

## 2020-02-13 DIAGNOSIS — Z87.19 H/O GASTROESOPHAGEAL REFLUX (GERD): ICD-10-CM

## 2020-02-13 DIAGNOSIS — F17.210 CIGARETTE NICOTINE DEPENDENCE WITHOUT COMPLICATION: ICD-10-CM

## 2020-02-13 PROCEDURE — 99204 OFFICE O/P NEW MOD 45 MIN: CPT | Performed by: FAMILY MEDICINE

## 2020-02-13 RX ORDER — PREDNISONE 5 MG/1
5 TABLET ORAL DAILY
Qty: 3 TAB | Refills: 0 | Status: SHIPPED | OUTPATIENT
Start: 2020-02-13 | End: 2020-02-23

## 2020-02-13 RX ORDER — DEXLANSOPRAZOLE 60 MG/1
CAPSULE, DELAYED RELEASE ORAL
COMMUNITY
End: 2020-02-13

## 2020-02-13 RX ORDER — DEXLANSOPRAZOLE 60 MG/1
1 CAPSULE, DELAYED RELEASE ORAL
Qty: 90 CAP | Refills: 3 | Status: SHIPPED | OUTPATIENT
Start: 2020-02-13 | End: 2021-02-01 | Stop reason: SDUPTHER

## 2020-02-13 ASSESSMENT — PATIENT HEALTH QUESTIONNAIRE - PHQ9: CLINICAL INTERPRETATION OF PHQ2 SCORE: 0

## 2020-02-13 NOTE — LETTER
[facility]  TABATHA Montero  [address] [place]  [place] [address]  Fax: [phone]   Authorization for Release/Disclosure of   Protected Health Information   Name: ISSA BARR : 1960 SSN: [id number]   Address: [address] [place], [place] [address] [address] Phone:    [phone] (home)    I authorize the entity listed below to release/disclose the PHI below to:   [facility]/TABATHA Montero and Afshan Bhat M.D.   Provider or Entity Name:  Dr Ayala   Address   City, [facility] Phone:      Fax:     Reason for request: continuity of care   Information to be released:    [  ] LAST COLONOSCOPY,  including any PATH REPORT and follow-up  [  ] LAST FIT/COLOGUARD RESULT [  ] LAST DEXA  [  ] LAST MAMMOGRAM  [  ] LAST PAP  [  ] LAST LABS [  ] RETINA EXAM REPORT  [  ] IMMUNIZATION RECORDS  [  ] Release all info      [  ] Check here and initial the line next to each item to release ALL health information INCLUDING  _____ Care and treatment for drug and / or alcohol abuse  _____ HIV testing, infection status, or AIDS  _____ Genetic Testing    DATES OF SERVICE OR TIME PERIOD TO BE DISCLOSED: _____________  I understand and acknowledge that:  * This Authorization may be revoked at any time by you in writing, except if your health information has already been used or disclosed.  * Your health information that will be used or disclosed as a result of you signing this authorization could be re-disclosed by the recipient. If this occurs, your re-disclosed health information may no longer be protected by State or Federal laws.  * You may refuse to sign this Authorization. Your refusal will not affect your ability to obtain treatment.  * This Authorization becomes effective upon signing and will  on (date) __________.      If no date is indicated, this Authorization will  one (1) year from the signature date.    Name: Issa Barr    Signature:   Date:     2020       PLEASE FAX REQUESTED RECORDS BACK TO: [phone]

## 2020-02-13 NOTE — LETTER
[facility]  TABATHA Montero  [address] [place] [address]  Fax: [phone]   Authorization for Release/Disclosure of   Protected Health Information   Name: ISSA BARR : 1960 SSN: [id number]   Address: [facility] [place] [address] Phone:    [phone] (home)    I authorize the entity listed below to release/disclose the PHI below to:   [facility]/TABATHA Montero and Afshan Bhat M.D.   Provider or Entity Name:  Dr lucia-    Address   City, [facility] Phone:      Fax:     Reason for request: continuity of care   Information to be released:    [  ] LAST COLONOSCOPY,  including any PATH REPORT and follow-up  [  ] LAST FIT/COLOGUARD RESULT [  ] LAST DEXA  [  ] LAST MAMMOGRAM  [  ] LAST PAP  [  ] LAST LABS [  ] RETINA EXAM REPORT  [  ] IMMUNIZATION RECORDS  [  ] Release all info      [  ] Check here and initial the line next to each item to release ALL health information INCLUDING  _____ Care and treatment for drug and / or alcohol abuse  _____ HIV testing, infection status, or AIDS  _____ Genetic Testing    DATES OF SERVICE OR TIME PERIOD TO BE DISCLOSED: _____________  I understand and acknowledge that:  * This Authorization may be revoked at any time by you in writing, except if your health information has already been used or disclosed.  * Your health information that will be used or disclosed as a result of you signing this authorization could be re-disclosed by the recipient. If this occurs, your re-disclosed health information may no longer be protected by State or Federal laws.  * You may refuse to sign this Authorization. Your refusal will not affect your ability to obtain treatment.  * This Authorization becomes effective upon signing and will  on (date) __________.      If no date is indicated, this Authorization will  one (1) year from the signature date.    Name: Issa Barr    Signature:   Date:          2/13/2020       PLEASE FAX REQUESTED RECORDS BACK TO: [phone]

## 2020-02-13 NOTE — LETTER
[facility]  TABATHA Montero  [address] [place] [address]  Fax: [phone]   Authorization for Release/Disclosure of   Protected Health Information   Name: ISSA BARR : 1960 SSN: [id number]   Address: [facility] [place] [address] Phone:    [phone] (home)    I authorize the entity listed below to release/disclose the PHI below to:   [facility]/TABATHA Montero and Afshan Bhat M.D.   Provider or Entity Name:  Dr Kameron PHELPS   Address   City, [facility], [facility] Phone:      Fax:     Reason for request: continuity of care   Information to be released:    [  ] LAST COLONOSCOPY,  including any PATH REPORT and follow-up  [  ] LAST FIT/COLOGUARD RESULT [  ] LAST DEXA  [  ] LAST MAMMOGRAM  [  ] LAST PAP  [  ] LAST LABS [  ] RETINA EXAM REPORT  [  ] IMMUNIZATION RECORDS  [  ] Release all info      [  ] Check here and initial the line next to each item to release ALL health information INCLUDING  _____ Care and treatment for drug and / or alcohol abuse  _____ HIV testing, infection status, or AIDS  _____ Genetic Testing    DATES OF SERVICE OR TIME PERIOD TO BE DISCLOSED: _____________  I understand and acknowledge that:  * This Authorization may be revoked at any time by you in writing, except if your health information has already been used or disclosed.  * Your health information that will be used or disclosed as a result of you signing this authorization could be re-disclosed by the recipient. If this occurs, your re-disclosed health information may no longer be protected by State or Federal laws.  * You may refuse to sign this Authorization. Your refusal will not affect your ability to obtain treatment.  * This Authorization becomes effective upon signing and will  on (date) __________.      If no date is indicated, this Authorization will  one (1) year from the signature date.    Name: Issa Barr    Signature:   Date:          2/13/2020       PLEASE FAX REQUESTED RECORDS BACK TO: [phone]

## 2020-02-13 NOTE — ASSESSMENT & PLAN NOTE
10 yr hx   Multiple studies done in [place]  Records requested.   Only steroids even low dose for 2-3 days provided sig relief.   Egd, colonos done multiple times  On dexilant needs refills  hemorr cream refilled  Prednisone 5 mg once a day x 3 days provided for flare up.

## 2020-02-13 NOTE — ASSESSMENT & PLAN NOTE
Declined chantix, wellbutrin.   Always had night sweats done.   Lung nodule was seen and was seen by a cancer specialist and it was stable.

## 2020-02-19 ENCOUNTER — TELEPHONE (OUTPATIENT)
Dept: MEDICAL GROUP | Facility: PHYSICIAN GROUP | Age: 60
End: 2020-02-19

## 2020-02-23 ENCOUNTER — OFFICE VISIT (OUTPATIENT)
Dept: URGENT CARE | Facility: PHYSICIAN GROUP | Age: 60
End: 2020-02-23
Payer: COMMERCIAL

## 2020-02-23 VITALS
HEART RATE: 90 BPM | SYSTOLIC BLOOD PRESSURE: 148 MMHG | TEMPERATURE: 97.7 F | BODY MASS INDEX: 22.81 KG/M2 | RESPIRATION RATE: 14 BRPM | HEIGHT: 69 IN | WEIGHT: 154 LBS | OXYGEN SATURATION: 98 % | DIASTOLIC BLOOD PRESSURE: 70 MMHG

## 2020-02-23 DIAGNOSIS — R68.89 FLU-LIKE SYMPTOMS: ICD-10-CM

## 2020-02-23 DIAGNOSIS — K58.0 IRRITABLE BOWEL SYNDROME WITH DIARRHEA: ICD-10-CM

## 2020-02-23 PROCEDURE — 99214 OFFICE O/P EST MOD 30 MIN: CPT | Performed by: PHYSICIAN ASSISTANT

## 2020-02-23 RX ORDER — DOXYCYCLINE 100 MG/1
100 TABLET ORAL 2 TIMES DAILY
Qty: 20 TAB | Refills: 0 | Status: SHIPPED | OUTPATIENT
Start: 2020-02-23 | End: 2020-05-16

## 2020-02-23 RX ORDER — PREDNISONE 10 MG/1
TABLET ORAL
Qty: 1 QUANTITY SUFFICIENT | Refills: 0 | Status: SHIPPED | OUTPATIENT
Start: 2020-02-23 | End: 2020-09-18

## 2020-02-23 RX ORDER — CODEINE PHOSPHATE AND GUAIFENESIN 10; 100 MG/5ML; MG/5ML
5 SOLUTION ORAL EVERY 12 HOURS PRN
Qty: 100 ML | Refills: 0 | Status: SHIPPED | OUTPATIENT
Start: 2020-02-23 | End: 2020-03-04

## 2020-02-23 NOTE — PROGRESS NOTES
No chief complaint on file.      HISTORY OF PRESENT ILLNESS: Patient is a 60 y.o. male who presents today for the following:    Patient comes in for evaluation of a cough that started 2 to 3 days ago.  He complains of headache, chills, body aches.  He does not have a thermometer at home so he did not check.  He has not been able to sleep because of the cough.  He does report nasal congestion but denies shortness of breath.  He just came in from [place] and is about to head back out of town for 2 more months and is concerned about needing antibiotics.  He also has a history of irritable bowel requiring prednisone.  His primary care provider who he established with here gave him a course but was only 5 mg pills and it did not help.  His GI doc in [place] previously would give him a much higher dose and it always knocked it out for a few months.    Patient Active Problem List    Diagnosis Date Noted   • Irritable bowel syndrome with diarrhea 02/13/2020   • History of hepatitis C 02/13/2020   • Cigarette nicotine dependence without complication 02/13/2020   • H/O gastroesophageal reflux (GERD) 07/23/2018       Allergies:Patient has no known allergies.    Current Outpatient Medications Ordered in Epic   Medication Sig Dispense Refill   • guaifenesin-codeine (ROBITUSSIN AC) Solution oral solution Take 5 mL by mouth every 12 hours as needed for Cough for up to 10 days. 100 mL 0   • predniSONE (DELTASONE) 10 MG Tab 50 mg x 1 day; 40 mg x 1 day; 30 mg x 1 day; 20 mg x 1 day; 10 mg x 1 day 1 Quantity Sufficient 0   • doxycycline monohydrate (ADOXA) 100 MG tablet Take 1 Tab by mouth 2 times a day. ANTIBIOTIC 20 Tab 0   • DEXILANT 60 MG CAPSULE DELAYED RELEASE delayed-release capsule Take 1 Cap by mouth every day. 90 Cap 3   • hydrocortisone-pramoxine (ANALPRAM-HC) 2.5-1 % rectal cream Insert 1 Applicator in rectum 3 times a day as needed. 1 Tube 5   • ibuprofen (MOTRIN) 800 MG Tab ibuprofen 800 mg tablet   TAKE 1 TABLET  "BY MOUTH TWICE A DAY       No current [facility]-ordered facility-administered medications on file.        History reviewed. No pertinent past medical history.    Social History     Tobacco Use   • Smoking status: Current Every Day Smoker     Packs/day: 1.50     Years: 30.00     Pack years: 45.00   • Smokeless tobacco: Former User   Substance Use Topics   • Alcohol use: Yes   • Drug use: Never       No family status information on file.   History reviewed. No pertinent family history.    Review of Systems:   Constitutional ROS: No unexpected change in weight, No weakness, No fatigue  Eye ROS: No recent significant change in vision, No eye pain, redness, discharge  Ear ROS: No drainage, No tinnitus or vertigo, No recent change in hearing  Mouth/Throat ROS: No teeth or gum problems, No bleeding gums, No tongue complaints  Neck ROS: No swollen glands, No significant pain in neck  Pulmonary ROS: No chronic cough, sputum, or hemoptysis, No dyspnea on exertion, No wheezing  Cardiovascular ROS: No diaphoresis, No edema, No palpitations  Musculoskeletal/Extremities ROS: No peripheral edema, No pain, redness or swelling on the joints  Hematologic/Lymphatic ROS: Positive for body aches and chills.  Skin/Integumentary ROS: No edema, No evidence of rash, No itching      Exam:  /70   Pulse 90   Temp 36.5 °C (97.7 °F)   Resp 14   Ht 1.753 m (5' 9\")   Wt 69.9 kg (154 lb)   SpO2 98%   General: Well developed, well nourished. No distress.    Eye: PERRL/EOMI; conjunctivae clear, lids normal.  ENMT: Lips without lesions, MMM. Oropharynx is clear. Bilateral TMs are within normal limits.  Pulmonary: Unlabored respiratory effort. Lungs clear to auscultation, no wheezes, no rhonchi.    Cardiovascular: Regular rate and rhythm without murmur.   Neurologic: Grossly nonfocal. No facial asymmetry noted.  Lymph: No cervical lymphadenopathy noted.  Skin: Warm, dry, good turgor. No rashes in visible areas.   Psych: Normal mood. Alert and " oriented to person, place and time.    Assessment/Plan:  Discussed likely viral etiology, possibly influenza.  Discussed with patient that his body aches, chills, and headache are fairly consistent with influenza.  Low suspicion for pneumonia given 98% on room air and clear lung sounds.  Providing contingent antibiotics as patient is about to go out of town for 2 months.  Steroids as directed. Discussed appropriate over-the-counter symptomatic medication, and when to return to clinic. Follow up for worsening or persistent symptoms.  1. Flu-like symptoms  guaifenesin-codeine (ROBITUSSIN AC) Solution oral solution    doxycycline monohydrate (ADOXA) 100 MG tablet   2. Irritable bowel syndrome with diarrhea  predniSONE (DELTASONE) 10 MG Tab

## 2020-02-25 ENCOUNTER — TELEPHONE (OUTPATIENT)
Dept: HEMATOLOGY ONCOLOGY | Facility: MEDICAL CENTER | Age: 60
End: 2020-02-25

## 2020-02-25 NOTE — TELEPHONE ENCOUNTER
Received referral to lung cancer screening program.  Chart review to assess for lung cancer screening program eligibility.   1. Age 55-77 yrs of age? Yes 60 y.o.  2. 30 pack year hx of smoking, or greater? Yes 1.5 [id number]= [id number] hx  3. Current smoker or if quit, has pt quit within last 15 yrs?Yes  Current smoker  4. Any signs or symptoms of lung cancer? None noted  5. Previous history of lung cancer? None noted  6. Chest CT within past 12 mos.? None noted  Patient does meet eligibility criteria. [facility] scheduling notified to schedule the shared decision making visit.

## 2020-02-27 ENCOUNTER — TELEPHONE (OUTPATIENT)
Dept: MEDICAL GROUP | Facility: PHYSICIAN GROUP | Age: 60
End: 2020-02-27

## 2020-02-27 NOTE — TELEPHONE ENCOUNTER
MEDICATION PRIOR AUTHORIZATION NEEDED:    1. Name of Medication: Hydrocortisone    2. Requested By (Name of Pharmacy): [facility]     3. Is insurance on file current? yes    4. What is the name & phone number of the 3rd party payor? Cover MY meds     KEY: [id number]

## 2020-02-28 ENCOUNTER — TELEPHONE (OUTPATIENT)
Dept: MEDICAL GROUP | Facility: PHYSICIAN GROUP | Age: 60
End: 2020-02-28

## 2020-02-29 NOTE — TELEPHONE ENCOUNTER
Patient's wife called stating that she has the shingles. Deandre is very concerned about getting the shingles now because he never did get the chicken pox. Wife wants to know when she'll no longer be contagious. Pt is currently out of town but wants to come home this weekend. Wife already started medication.     I advised the wife that she'll be no longer be contagious once the rash stops spreading and the pus pockets dry up. I advised her that he'll be able to come home and to make sure they practice good hand hygiene. Anything else to add?

## 2020-04-13 ENCOUNTER — APPOINTMENT (OUTPATIENT)
Dept: HEMATOLOGY ONCOLOGY | Facility: MEDICAL CENTER | Age: 60
End: 2020-04-13
Payer: COMMERCIAL

## 2020-04-16 ENCOUNTER — OFFICE VISIT (OUTPATIENT)
Dept: MEDICAL GROUP | Facility: PHYSICIAN GROUP | Age: 60
End: 2020-04-16
Payer: COMMERCIAL

## 2020-04-16 VITALS
OXYGEN SATURATION: 95 % | HEIGHT: 69 IN | HEART RATE: 94 BPM | WEIGHT: 140 LBS | BODY MASS INDEX: 20.73 KG/M2 | RESPIRATION RATE: 12 BRPM | SYSTOLIC BLOOD PRESSURE: 132 MMHG | TEMPERATURE: 99 F | DIASTOLIC BLOOD PRESSURE: 84 MMHG

## 2020-04-16 DIAGNOSIS — K58.0 IRRITABLE BOWEL SYNDROME WITH DIARRHEA: ICD-10-CM

## 2020-04-16 DIAGNOSIS — M79.671 RIGHT FOOT PAIN: ICD-10-CM

## 2020-04-16 PROCEDURE — 99214 OFFICE O/P EST MOD 30 MIN: CPT | Performed by: NURSE PRACTITIONER

## 2020-04-16 RX ORDER — DICYCLOMINE HYDROCHLORIDE 10 MG/1
10 CAPSULE ORAL
Qty: 120 CAP | Refills: 1 | Status: SHIPPED | OUTPATIENT
Start: 2020-04-16 | End: 2020-06-17 | Stop reason: SDUPTHER

## 2020-04-16 RX ORDER — IBUPROFEN 800 MG/1
800 TABLET ORAL EVERY 8 HOURS PRN
Qty: 90 TAB | Refills: 1 | Status: SHIPPED | OUTPATIENT
Start: 2020-04-16 | End: 2020-06-17 | Stop reason: SDUPTHER

## 2020-04-16 ASSESSMENT — FIBROSIS 4 INDEX: FIB4 SCORE: 0.92

## 2020-04-16 NOTE — PROGRESS NOTES
Chief Complaint   Patient presents with   • Foot Pain   • Abdominal Pain     up to ten times daily         This is a 60 y.o.male patient that presents today with the following: Bilateral foot pain, abdominal pain    Right foot pain  She has chronic bilateral foot pain, right greater than left.  He is on his feet for several hours per day, he has pain in the heel upon first standing in the morning or when he gets out of his truck after being in the car for prolonged period of time.  He has been taking ibuprofen which seems to help, he is requesting a prescription for 800 mg which is reasonable.  He was advised to take this with food.  He was given printed education material and symptoms are very consistent with plantar fasciitis.  He does understand that if symptoms do not improve with supportive measures he may need referral to podiatry.    Irritable bowel syndrome with diarrhea  This is a chronic condition, going on for greater than 10 years.  He has had significant work-up when living in [place], he has been diagnosed with irritable bowel syndrome with diarrhea.  He has been on Dexilant for several years, does not need a refill.  He does agree to starting dicyclomine 10 mg up to 4 times a day as needed.  We discussed the possible use of probiotic.  He was referred to gastroenterology by his regular PCP but has not followed up with that referral, he was given contact information to make an appointment.      No visits with results within 1 Month(s) from this visit.   Latest known visit with results is:   Admission on 09/10/2019, Discharged on 09/10/2019   Component Date Value   • WBC 09/10/2019 12.4*   • RBC 09/10/2019 4.90    • Hemoglobin 09/10/2019 16.3    • Hematocrit 09/10/2019 48.0    • MCV 09/10/2019 98.0*   • MCH 09/10/2019 33.3*   • MCHC 09/10/2019 34.0    • RDW 09/10/2019 43.9    • Platelet Count 09/10/2019 280    • MPV 09/10/2019 9.3    • Neutrophils-Polys 09/10/2019 76.90*   • Lymphocytes 09/10/2019  14.50*   • Monocytes 09/10/2019 7.00    • Eosinophils 09/10/2019 0.70    • Basophils 09/10/2019 0.40    • Immature Granulocytes 09/10/2019 0.50    • Nucleated RBC 09/10/2019 0.00    • Neutrophils (Absolute) 09/10/2019 9.53*   • Lymphs (Absolute) 09/10/2019 1.80    • Monos (Absolute) 09/10/2019 0.87*   • Eos (Absolute) 09/10/2019 0.09    • Baso (Absolute) 09/10/2019 0.05    • Immature Granulocytes (a* 09/10/2019 0.06    • NRBC (Absolute) 09/10/2019 0.00    • Sodium 09/10/2019 138    • Potassium 09/10/2019 3.8    • Chloride 09/10/2019 107    • Co2 09/10/2019 25    • Anion Gap 09/10/2019 6.0    • Glucose 09/10/2019 90    • Bun 09/10/2019 14    • Creatinine 09/10/2019 0.80    • Calcium 09/10/2019 8.8    • AST(SGOT) 09/10/2019 18    • ALT(SGPT) 09/10/2019 17    • Alkaline Phosphatase 09/10/2019 73    • Total Bilirubin 09/10/2019 1.0    • Albumin 09/10/2019 3.9    • Total Protein 09/10/2019 6.8    • Globulin 09/10/2019 2.9    • A-G Ratio 09/10/2019 1.3    • Lipase 09/10/2019 27    • Color 09/10/2019 Yellow    • Character 09/10/2019 Clear    • Specific Gravity 09/10/2019 1.011    • Ph 09/10/2019 5.5    • Glucose 09/10/2019 Negative    • Ketones 09/10/2019 Negative    • Protein 09/10/2019 Negative    • Bilirubin 09/10/2019 Negative    • Urobilinogen, Urine 09/10/2019 0.2    • Nitrite 09/10/2019 Negative    • Leukocyte Esterase 09/10/2019 Negative    • Occult Blood 09/10/2019 Negative    • Micro Urine Req 09/10/2019 see below    • GFR If  09/10/2019 >60    • GFR If Non  Ameri* 09/10/2019 >60          clinical course has been stable    No past medical history on file.    No past surgical history on file.    No family history on file.    Patient has no known allergies.    Current Outpatient Medications Ordered in Epic   Medication Sig Dispense Refill   • ibuprofen (MOTRIN) 800 MG Tab Take 1 Tab by mouth every 8 hours as needed. 90 Tab 1   • dicyclomine (BENTYL) 10 MG Cap Take 1 Cap by mouth 4 Times a  "Day,Before Meals and at Bedtime. 120 Cap 1   • predniSONE (DELTASONE) 10 MG Tab 50 mg x 1 day; 40 mg x 1 day; 30 mg x 1 day; 20 mg x 1 day; 10 mg x 1 day 1 Quantity Sufficient 0   • DEXILANT 60 MG CAPSULE DELAYED RELEASE delayed-release capsule Take 1 Cap by mouth every day. 90 Cap 3   • ibuprofen (MOTRIN) 800 MG Tab ibuprofen 800 mg tablet   TAKE 1 TABLET BY MOUTH TWICE A DAY     • pramoxine-hydrocortisone (ANALPRAM-HC) 1-1 % rectal cream Insert 1 g in rectum 2 times a day as needed. 28.4 g 6   • doxycycline monohydrate (ADOXA) 100 MG tablet Take 1 Tab by mouth 2 times a day. ANTIBIOTIC 20 Tab 0     No current Epic-ordered facility-administered medications on file.        Constitutional ROS: No unexpected change in weight, No weakness, No unexplained fevers, sweats, or chills  Pulmonary ROS: No chronic cough, sputum, or hemoptysis, No shortness of breath, No recent change in breathing  Cardiovascular ROS: No chest pain  Gastrointestinal ROS: Positive per HPI  Musculoskeletal/Extremities ROS: Positive per HPI  Neurologic ROS: Normal development, No seizures, No weakness    Physical exam:  /84   Pulse 94   Temp 37.2 °C (99 °F) (Temporal)   Resp 12   Ht 1.753 m (5' 9\")   Wt 63.5 kg (140 lb)   SpO2 95%   BMI 20.67 kg/m²   General Appearance: Pleasant middle-aged older male, alert, no distress, well-nourished, well-groomed  Skin: Skin color, texture, turgor normal. No rashes or lesions.  Lungs: negative findings: normal respiratory rate and rhythm, normal effort  Extremities: negative findings: ROM of all joints is normal, positive findings: Tenderness with palpation to lateral aspect of right foot just distal to the malleolus  Musculoskeletal: negative findings: no evidence of joint instability, no evidence of muscle atrophy, no deformities present  Neurologic: intact    Medical decision making/discussion:     Ibuprofen    Bentyl for abdominal cramps    Consider taking probiotic     Call number below to " set up appt with GI    Printed educational material on plantar fasciitis    Deandre was seen today for foot pain and abdominal pain.    Diagnoses and all orders for this visit:    Right foot pain  -     ibuprofen (MOTRIN) 800 MG Tab; Take 1 Tab by mouth every 8 hours as needed.    Irritable bowel syndrome with diarrhea  -     dicyclomine (BENTYL) 10 MG Cap; Take 1 Cap by mouth 4 Times a Day,Before Meals and at Bedtime.        No follow-ups on file.        Please note that this dictation was created using voice recognition software. I have made every reasonable attempt to correct obvious errors, but I expect that there are errors of grammar and possibly content that I did not discover before finalizing the note.

## 2020-04-16 NOTE — PATIENT INSTRUCTIONS
Ibuprofen    Bentyl for abdominal cramps    Consider taking probiotic     Call number below to set up appt with GI    [facility]  [address] [place]. [address]  Phone: [phone]  Fax: [phone]    Plantar Fasciitis  Plantar fasciitis is a painful foot condition that affects the heel. It occurs when the band of tissue that connects the toes to the heel bone (plantar fascia) becomes irritated. This can happen after exercising too much or doing other repetitive activities (overuse injury). The pain from plantar fasciitis can range from mild irritation to severe pain that makes it difficult for you to walk or move. The pain is usually worse in the morning or after you have been sitting or lying down for a while.  CAUSES  This condition may be caused by:  · Standing for long periods of time.  · Wearing shoes that do not fit.  · Doing high-impact activities, including running, aerobics, and ballet.  · Being overweight.  · Having an abnormal way of walking (gait).  · Having tight calf muscles.  · Having high arches in your feet.  · Starting a new athletic activity.  SYMPTOMS  The main symptom of this condition is heel pain. Other symptoms include:  · Pain that gets worse after activity or exercise.  · Pain that is worse in the morning or after resting.  · Pain that goes away after you walk for a few minutes.  DIAGNOSIS  This condition may be diagnosed based on your signs and symptoms. Your health care provider will also do a physical exam to check for:  · A tender area on the bottom of your foot.  · A high arch in your foot.  · Pain when you move your foot.  · Difficulty moving your foot.  You may also need to have imaging studies to confirm the diagnosis. These can include:  · X-rays.  · Ultrasound.  · MRI.  TREATMENT   Treatment for plantar fasciitis depends on the severity of the condition. Your treatment may include:  · Rest, ice, and over-the-counter pain medicines to manage your  pain.  · Exercises to stretch your calves and your plantar fascia.  · A splint that holds your foot in a stretched, upward position while you sleep (night splint).  · Physical therapy to relieve symptoms and prevent problems in the future.  · Cortisone injections to relieve severe pain.  · Extracorporeal shock wave therapy (ESWT) to stimulate damaged plantar fascia with electrical impulses. It is often used as a last resort before surgery.  · Surgery, if other treatments have not worked after 12 months.  HOME CARE INSTRUCTIONS  · Take medicines only as directed by your health care provider.  · Avoid activities that cause pain.  · Roll the bottom of your foot over a bag of ice or a bottle of cold water. Do this for 20 minutes, 3-4 times a day.  · Perform simple stretches as directed by your health care provider.  · Try wearing athletic shoes with air-sole or gel-sole cushions or soft shoe inserts.  · Wear a night splint while sleeping, if directed by your health care provider.  · Keep all follow-up appointments with your health care provider.  PREVENTION   · Do not perform exercises or activities that cause heel pain.  · Consider finding low-impact activities if you continue to have problems.  · Lose weight if you need to.  The best way to prevent plantar fasciitis is to avoid the activities that aggravate your plantar fascia.  SEEK MEDICAL CARE IF:  · Your symptoms do not go away after treatment with home care measures.  · Your pain gets worse.  · Your pain affects your ability to move or do your daily activities.  This information is not intended to replace advice given to you by your health care provider. Make sure you discuss any questions you have with your health care provider.  Document Released: 09/12/2002 Document Revised: 04/10/2017 Document Reviewed: 10/28/2015  [employer] Patient Education © 2017 [employer]

## 2020-04-20 PROBLEM — M79.671 RIGHT FOOT PAIN: Status: ACTIVE | Noted: 2020-04-20

## 2020-04-20 PROBLEM — M95.8 OSTEOCHONDRAL DEFECT OF TALUS: Status: ACTIVE | Noted: 2017-03-06

## 2020-04-20 NOTE — ASSESSMENT & PLAN NOTE
She has chronic bilateral foot pain, right greater than left.  He is on his feet for several hours per day, he has pain in the heel upon first standing in the morning or when he gets out of his truck after being in the car for prolonged period of time.  He has been taking ibuprofen which seems to help, he is requesting a prescription for 800 mg which is reasonable.  He was advised to take this with food.  He was given printed education material and symptoms are very consistent with plantar fasciitis.  He does understand that if symptoms do not improve with supportive measures he may need referral to podiatry.

## 2020-04-20 NOTE — ASSESSMENT & PLAN NOTE
This is a chronic condition, going on for greater than 10 years.  He has had significant work-up when living in [place], he has been diagnosed with irritable bowel syndrome with diarrhea.  He has been on Dexilant for several years, does not need a refill.  He does agree to starting dicyclomine 10 mg up to 4 times a day as needed.  We discussed the possible use of probiotic.  He was referred to gastroenterology by his regular PCP but has not followed up with that referral, he was given contact information to make an appointment.

## 2020-05-10 ENCOUNTER — OFFICE VISIT (OUTPATIENT)
Dept: URGENT CARE | Facility: PHYSICIAN GROUP | Age: 60
End: 2020-05-10
Payer: COMMERCIAL

## 2020-05-10 VITALS
WEIGHT: 148.8 LBS | HEART RATE: 89 BPM | DIASTOLIC BLOOD PRESSURE: 76 MMHG | BODY MASS INDEX: 22.04 KG/M2 | TEMPERATURE: 100.9 F | RESPIRATION RATE: 16 BRPM | OXYGEN SATURATION: 97 % | HEIGHT: 69 IN | SYSTOLIC BLOOD PRESSURE: 120 MMHG

## 2020-05-10 DIAGNOSIS — K04.7 DENTAL INFECTION: ICD-10-CM

## 2020-05-10 DIAGNOSIS — K08.89 PAIN, DENTAL: ICD-10-CM

## 2020-05-10 PROCEDURE — 99214 OFFICE O/P EST MOD 30 MIN: CPT | Performed by: PHYSICIAN ASSISTANT

## 2020-05-10 RX ORDER — HYDROCODONE BITARTRATE AND ACETAMINOPHEN 5; 325 MG/1; MG/1
1 TABLET ORAL EVERY 6 HOURS PRN
Qty: 12 TAB | Refills: 0 | Status: SHIPPED | OUTPATIENT
Start: 2020-05-10 | End: 2020-05-13

## 2020-05-10 RX ORDER — AMOXICILLIN AND CLAVULANATE POTASSIUM 875; 125 MG/1; MG/1
1 TABLET, FILM COATED ORAL 2 TIMES DAILY
Qty: 20 TAB | Refills: 0 | Status: SHIPPED | OUTPATIENT
Start: 2020-05-10 | End: 2020-05-16

## 2020-05-10 ASSESSMENT — FIBROSIS 4 INDEX: FIB4 SCORE: 0.92

## 2020-05-10 NOTE — PROGRESS NOTES
Chief Complaint   Patient presents with   • Otalgia     roof of his mouth hurts, thought he had a dental infection started a medication that he had, stopped it because it hurt his stomach woke up at 3 am today an his whole head hurt ears temple, gums roof of his mouth   • Fever       HISTORY OF PRESENT ILLNESS: Patient is a 60 y.o. male who presents today for the following:    Patient comes in for evaluation of pain in the roof of his mouth and right ear pain.  He states the pain in his mouth started about 2 weeks ago.  He started taking some leftover doxycycline, approximately 3-4 doses, and did have some improvement but it was upsetting his stomach so he stopped the medication.  Over the last 24 to 48 hours the pain is worsened extending into his right cheek, right ear and to some extent into the right side of the forehead and right mandible.  He does feel feverish.  Acetaminophen and ibuprofen have not been helping with the pain.  His dentist does not take any patients at this time.    Patient Active Problem List    Diagnosis Date Noted   • Right foot pain 04/20/2020   • Irritable bowel syndrome with diarrhea 02/13/2020   • History of hepatitis C 02/13/2020   • Cigarette nicotine dependence without complication 02/13/2020   • H/O gastroesophageal reflux (GERD) 07/23/2018   • Osteochondral defect of talus 03/06/2017   • Allergic urticaria 09/23/2012       Allergies:Patient has no known allergies.    Current Outpatient Medications Ordered in Epic   Medication Sig Dispense Refill   • amoxicillin-clavulanate (AUGMENTIN) 875-125 MG Tab Take 1 Tab by mouth 2 times a day. 20 Tab 0   • HYDROcodone-acetaminophen (NORCO) 5-325 MG Tab per tablet Take 1 Tab by mouth every 6 hours as needed (pain) for up to 3 days. 12 Tab 0   • dicyclomine (BENTYL) 10 MG Cap Take 1 Cap by mouth 4 Times a Day,Before Meals and at Bedtime. 120 Cap 1   • doxycycline monohydrate (ADOXA) 100 MG tablet Take 1 Tab by mouth 2 times a day. ANTIBIOTIC  20 Tab 0   • DEXILANT 60 MG CAPSULE DELAYED RELEASE delayed-release capsule Take 1 Cap by mouth every day. 90 Cap 3   • ibuprofen (MOTRIN) 800 MG Tab ibuprofen 800 mg tablet   TAKE 1 TABLET BY MOUTH TWICE A DAY     • dyclonine (SUCRETS) 2 MG Lozenge      • ibuprofen (MOTRIN) 800 MG Tab Take 1 Tab by mouth every 8 hours as needed. (Patient not taking: Reported on 5/10/2020) 90 Tab 1   • pramoxine-hydrocortisone (ANALPRAM-HC) 1-1 % rectal cream Insert 1 g in rectum 2 times a day as needed. (Patient not taking: Reported on 5/10/2020) 28.4 g 6   • predniSONE (DELTASONE) 10 MG Tab 50 mg x 1 day; 40 mg x 1 day; 30 mg x 1 day; 20 mg x 1 day; 10 mg x 1 day (Patient not taking: Reported on 5/10/2020) 1 Quantity Sufficient 0     No current Epic-ordered facility-administered medications on file.        History reviewed. No pertinent past medical history.    Social History     Tobacco Use   • Smoking status: Current Every Day Smoker     Packs/day: 1.50     Years: 30.00     Pack years: 45.00   • Smokeless tobacco: Former User   Substance Use Topics   • Alcohol use: Yes   • Drug use: Never       No family status information on file.   History reviewed. No pertinent family history.    Review of Systems:   Constitutional ROS: No unexpected change in weight, No weakness, No fatigue  Eye ROS: No recent significant change in vision, No eye pain, redness, discharge  Ear ROS: No drainage, No tinnitus or vertigo, No recent change in hearing  Mouth/Throat ROS: No teeth or gum problems, No bleeding gums, No tongue complaints  Neck ROS: No swollen glands, No significant pain in neck  Pulmonary ROS: No chronic cough, sputum, or hemoptysis, No dyspnea on exertion, No wheezing  Cardiovascular ROS: No diaphoresis, No edema, No palpitations  Musculoskeletal/Extremities ROS: No peripheral edema, No pain, redness or swelling on the joints  Hematologic/Lymphatic ROS: Positive for fever.  Skin/Integumentary ROS: No edema, No evidence of rash, No  "itching      Exam:  /76   Pulse 89   Temp (!) 38.3 °C (100.9 °F) (Temporal)   Resp 16   Ht 1.753 m (5' 9\")   Wt 67.5 kg (148 lb 12.8 oz)   SpO2 97%   General: Well developed, well nourished. No distress.    HEENT: No facial swelling noted.  Right EAC and TM are within normal limits.  No swelling or erythema noted on the roof of the mouth but there is some gumline swelling and erythema noted on the top right near the canine.   Pulmonary: Unlabored respiratory effort.    Neurologic: Grossly nonfocal. No facial asymmetry noted.  Skin: Warm, dry, good turgor. No rashes in visible areas.   Psych: Normal mood. Alert and oriented to person, place and time.    Assessment/Plan:  Starting antibiotics.  Am concerned there may be some sinus involvement.  Follow-up with a dentist.  Minimal change in pain with over-the-counter pain medication.  Starting hydrocodone/acetaminophen. Follow up for worsening or persistent symptoms.    Prescription Monitoring Program report was reviewed. No evidence of medication abuse or misuse. Discussed the Controlled Substance Use Informed Consent which includes risks and benefits, proper use, storage and disposal, refills, minors, opioids and narcotics, and alternatives. I have assessed the patient’s risk for abuse, dependency, and addiction using the validated Opioid Risk Tool available at https://www.mdcalc.com/polqme-hiqe-yfwu-ort-narcotic-abuse. All questions answered.     1. Dental infection  amoxicillin-clavulanate (AUGMENTIN) 875-125 MG Tab    HYDROcodone-acetaminophen (NORCO) 5-325 MG Tab per tablet   2. Pain, dental  HYDROcodone-acetaminophen (NORCO) 5-325 MG Tab per tablet       "

## 2020-05-16 ENCOUNTER — OFFICE VISIT (OUTPATIENT)
Dept: URGENT CARE | Facility: PHYSICIAN GROUP | Age: 60
End: 2020-05-16
Payer: COMMERCIAL

## 2020-05-16 VITALS
BODY MASS INDEX: 22.15 KG/M2 | RESPIRATION RATE: 16 BRPM | HEART RATE: 80 BPM | WEIGHT: 150 LBS | SYSTOLIC BLOOD PRESSURE: 144 MMHG | TEMPERATURE: 98.7 F | DIASTOLIC BLOOD PRESSURE: 82 MMHG | OXYGEN SATURATION: 95 %

## 2020-05-16 DIAGNOSIS — K05.00 GINGIVITIS, ACUTE, PLAQUE INDUCED: ICD-10-CM

## 2020-05-16 DIAGNOSIS — K04.7 DENTAL INFECTION: ICD-10-CM

## 2020-05-16 DIAGNOSIS — K08.89 PAIN, DENTAL: ICD-10-CM

## 2020-05-16 PROCEDURE — 99203 OFFICE O/P NEW LOW 30 MIN: CPT | Performed by: INTERNAL MEDICINE

## 2020-05-16 RX ORDER — CLINDAMYCIN HYDROCHLORIDE 300 MG/1
300 CAPSULE ORAL 3 TIMES DAILY
Qty: 21 CAP | Refills: 0 | Status: SHIPPED | OUTPATIENT
Start: 2020-05-16 | End: 2020-05-23

## 2020-05-16 ASSESSMENT — ENCOUNTER SYMPTOMS
DIZZINESS: 0
FEVER: 0
HEADACHES: 0

## 2020-05-16 ASSESSMENT — FIBROSIS 4 INDEX: FIB4 SCORE: 0.92

## 2020-05-16 NOTE — PROGRESS NOTES
Subjective:     Deandre Barr is a 60 y.o. male who presents for Dental Pain (was seen for the same thing last week was getting better but is now worse-still taking the medication given last time )     Patient complains of right upper front teeth hurts and it was getting better and is taking Augmentin, and now it is pain started again he says he gets relief with when he takes ibuprofen, is not able to see a dentist yet  Dental Pain    This is a new problem. The current episode started in the past 7 days. The problem occurs constantly. The problem has been waxing and waning. The pain is moderate. Pertinent negatives include no facial pain or fever. He has tried NSAIDs for the symptoms. The treatment provided moderate relief.   History reviewed. No pertinent past medical history.History reviewed. No pertinent surgical history.  Social History     Socioeconomic History   • Marital status: Single     Spouse name: Not on file   • Number of children: Not on file   • Years of education: Not on file   • Highest education level: Not on file   Occupational History   • Not on file   Social Needs   • Financial resource strain: Not on file   • Food insecurity     Worry: Not on file     Inability: Not on file   • Transportation needs     Medical: Not on file     Non-medical: Not on file   Tobacco Use   • Smoking status: Current Every Day Smoker     Packs/day: 1.50     Years: 30.00     Pack years: 45.00   • Smokeless tobacco: Former User   Substance and Sexual Activity   • Alcohol use: Yes   • Drug use: Never   • Sexual activity: Not on file   Lifestyle   • Physical activity     Days per week: Not on file     Minutes per session: Not on file   • Stress: Not on file   Relationships   • Social connections     Talks on phone: Not on file     Gets together: Not on file     Attends Yarsanism service: Not on file     Active member of club or organization: Not on file     Attends meetings of clubs or organizations: Not on file      Relationship status: Not on file   • Intimate partner violence     Fear of current or ex partner: Not on file     Emotionally abused: Not on file     Physically abused: Not on file     Forced sexual activity: Not on file   Other Topics Concern   • Not on file   Social History Narrative   • Not on file    History reviewed. No pertinent family history. Review of Systems   Constitutional: Negative for fever.   Neurological: Negative for dizziness and headaches.   All other systems reviewed and are negative.  No Known Allergies   Objective:   /82   Pulse 80   Temp 37.1 °C (98.7 °F)   Resp 16   Wt 68 kg (150 lb)   SpO2 95%   BMI 22.15 kg/m²   Physical Exam  Vitals signs reviewed.   Constitutional:       General: He is not in acute distress.     Appearance: He is well-developed.   HENT:      Head: Normocephalic and atraumatic.      Mouth/Throat:      Comments: Gum swelling present diffusely more so in the right upper part, minimal tenderness present, no facial swelling, no lymphadenopathy  Eyes:      Conjunctiva/sclera: Conjunctivae normal.   Cardiovascular:      Rate and Rhythm: Normal rate and regular rhythm.   Pulmonary:      Effort: Pulmonary effort is normal.      Breath sounds: Normal breath sounds.   Skin:     General: Skin is warm and dry.   Neurological:      Mental Status: He is alert and oriented to person, place, and time.   Psychiatric:         Thought Content: Thought content normal.           Assessment/Plan:   Assessment    1. Gingivitis, acute, plaque induced  - clindamycin (CLEOCIN) 300 MG Cap; Take 1 Cap by mouth 3 times a day for 7 days.  Dispense: 21 Cap; Refill: 0    2. Dental infection  - clindamycin (CLEOCIN) 300 MG Cap; Take 1 Cap by mouth 3 times a day for 7 days.  Dispense: 21 Cap; Refill: 0    3. Pain, dental  - clindamycin (CLEOCIN) 300 MG Cap; Take 1 Cap by mouth 3 times a day for 7 days.  Dispense: 21 Cap; Refill: 0    Advised patient to stop Augmentin and follow-up with her  dentist, continue ibuprofen 800mg  3 times a day, and Tylenol 1 g 3 times daily  Differential diagnosis, natural history, supportive care, and indications for immediate follow-up discussed.

## 2020-06-16 DIAGNOSIS — M79.671 RIGHT FOOT PAIN: ICD-10-CM

## 2020-06-16 DIAGNOSIS — K58.0 IRRITABLE BOWEL SYNDROME WITH DIARRHEA: ICD-10-CM

## 2020-06-16 RX ORDER — DICYCLOMINE HYDROCHLORIDE 10 MG/1
10 CAPSULE ORAL
Qty: 120 CAP | Refills: 1 | Status: CANCELLED | OUTPATIENT
Start: 2020-06-16

## 2020-06-17 RX ORDER — IBUPROFEN 800 MG/1
800 TABLET ORAL EVERY 8 HOURS PRN
Qty: 90 TAB | Refills: 1 | Status: SHIPPED | OUTPATIENT
Start: 2020-06-17 | End: 2020-08-25 | Stop reason: SDUPTHER

## 2020-08-18 DIAGNOSIS — K58.0 IRRITABLE BOWEL SYNDROME WITH DIARRHEA: ICD-10-CM

## 2020-08-24 RX ORDER — DICYCLOMINE HYDROCHLORIDE 10 MG/1
CAPSULE ORAL
Qty: 120 CAP | Refills: 3 | Status: SHIPPED | OUTPATIENT
Start: 2020-08-24 | End: 2021-01-12 | Stop reason: SDUPTHER

## 2020-08-25 DIAGNOSIS — M79.671 RIGHT FOOT PAIN: ICD-10-CM

## 2020-08-25 RX ORDER — IBUPROFEN 800 MG/1
800 TABLET ORAL EVERY 8 HOURS PRN
Qty: 90 TAB | Refills: 1 | Status: SHIPPED | OUTPATIENT
Start: 2020-08-25 | End: 2021-04-12

## 2020-09-18 ENCOUNTER — HOSPITAL ENCOUNTER (OUTPATIENT)
Facility: MEDICAL CENTER | Age: 60
End: 2020-09-18
Attending: PHYSICIAN ASSISTANT
Payer: COMMERCIAL

## 2020-09-18 ENCOUNTER — OFFICE VISIT (OUTPATIENT)
Dept: URGENT CARE | Facility: PHYSICIAN GROUP | Age: 60
End: 2020-09-18
Payer: COMMERCIAL

## 2020-09-18 VITALS
SYSTOLIC BLOOD PRESSURE: 110 MMHG | TEMPERATURE: 98.6 F | OXYGEN SATURATION: 98 % | DIASTOLIC BLOOD PRESSURE: 70 MMHG | WEIGHT: 154.4 LBS | HEIGHT: 69 IN | BODY MASS INDEX: 22.87 KG/M2 | RESPIRATION RATE: 18 BRPM | HEART RATE: 107 BPM

## 2020-09-18 DIAGNOSIS — J20.9 BRONCHOSPASM WITH BRONCHITIS, ACUTE: ICD-10-CM

## 2020-09-18 DIAGNOSIS — R05.9 COUGH: ICD-10-CM

## 2020-09-18 LAB
COVID ORDER STATUS COVID19: NORMAL
SARS-COV-2 RNA RESP QL NAA+PROBE: DETECTED
SPECIMEN SOURCE: ABNORMAL

## 2020-09-18 PROCEDURE — 99214 OFFICE O/P EST MOD 30 MIN: CPT | Performed by: PHYSICIAN ASSISTANT

## 2020-09-18 PROCEDURE — U0003 INFECTIOUS AGENT DETECTION BY NUCLEIC ACID (DNA OR RNA); SEVERE ACUTE RESPIRATORY SYNDROME CORONAVIRUS 2 (SARS-COV-2) (CORONAVIRUS DISEASE [COVID-19]), AMPLIFIED PROBE TECHNIQUE, MAKING USE OF HIGH THROUGHPUT TECHNOLOGIES AS DESCRIBED BY CMS-2020-01-R: HCPCS

## 2020-09-18 RX ORDER — PREDNISONE 10 MG/1
40 TABLET ORAL DAILY
Qty: 20 TAB | Refills: 0 | Status: SHIPPED | OUTPATIENT
Start: 2020-09-18 | End: 2020-09-23

## 2020-09-18 RX ORDER — ALBUTEROL SULFATE 90 UG/1
2 AEROSOL, METERED RESPIRATORY (INHALATION) EVERY 6 HOURS PRN
Qty: 8.5 G | Refills: 0 | Status: SHIPPED | OUTPATIENT
Start: 2020-09-18 | End: 2021-04-12

## 2020-09-18 ASSESSMENT — ENCOUNTER SYMPTOMS
NAUSEA: 0
EYE PAIN: 0
COUGH: 1
HEADACHES: 0
VOMITING: 0
MYALGIAS: 0
ABDOMINAL PAIN: 0
DIARRHEA: 0
CONSTIPATION: 0
SORE THROAT: 0
CHILLS: 0
FEVER: 0
SHORTNESS OF BREATH: 0

## 2020-09-18 ASSESSMENT — FIBROSIS 4 INDEX: FIB4 SCORE: 0.92

## 2020-09-18 NOTE — PROGRESS NOTES
"Subjective:   Deandre Barr is a 60 y.o. male who presents for Cough (2-3 days) and Sinusitis      This is a 60-year-old male who presents complaining of 2 to 3 days of runny nose and a cough that he expectorates is postnasal drip.  He reports no decrease in his energy, no chills, no other constitutional symptoms.  He states \"I feel fine except for this cough\".  Patient denies any shortness of breath or chest pain.  He has no history of reactive airway disease but has a 30+ year history of smoking.      Review of Systems   Constitutional: Negative for chills, fever and malaise/fatigue.   HENT: Positive for congestion. Negative for ear pain and sore throat.    Eyes: Negative for pain.   Respiratory: Positive for cough. Negative for shortness of breath.    Cardiovascular: Negative for chest pain.   Gastrointestinal: Negative for abdominal pain, constipation, diarrhea, nausea and vomiting.   Genitourinary: Negative for dysuria.   Musculoskeletal: Negative for myalgias.   Skin: Negative for rash.   Neurological: Negative for headaches.       Medications: (Including those prescribed at today's visit)   • albuterol Aers  • Dexilant Cpdr  • dicyclomine Caps  • dyclonine Lozg  • ibuprofen Tabs  • pramoxine-hydrocortisone  • predniSONE Tabs    Allergies: Patient has no known allergies.    Problem List: Deandre Barr has H/O gastroesophageal reflux (GERD); Irritable bowel syndrome with diarrhea; History of hepatitis C; Cigarette nicotine dependence without complication; Right foot pain; Allergic urticaria; and Osteochondral defect of talus on their problem list.    Surgical History:  No past surgical history on file.    Past Social Hx: Deandre Barr  reports that he has been smoking. He has a 45.00 pack-year smoking history. He has quit using smokeless tobacco. He reports current alcohol use. He reports that he does not use drugs.     Past Family Hx:  Deandre Barr family history is not on file.     Problem " "list, medications, and allergies reviewed by myself today in Epic.     Objective:     /70   Pulse (!) 107   Temp 37 °C (98.6 °F) (Temporal)   Resp 18   Ht 1.753 m (5' 9\")   Wt 70 kg (154 lb 6.4 oz)   SpO2 98%   BMI 22.80 kg/m²     Physical Exam  Vitals signs reviewed.   Constitutional:       General: He is not in acute distress.     Appearance: Normal appearance. He is not ill-appearing.   HENT:      Head: Normocephalic and atraumatic.      Right Ear: Tympanic membrane, ear canal and external ear normal.      Left Ear: Tympanic membrane, ear canal and external ear normal.      Nose: Congestion and rhinorrhea present.      Mouth/Throat:      Mouth: Mucous membranes are moist.      Pharynx: Posterior oropharyngeal erythema present. No oropharyngeal exudate.   Eyes:      Conjunctiva/sclera: Conjunctivae normal.   Cardiovascular:      Rate and Rhythm: Normal rate and regular rhythm.   Pulmonary:      Effort: Pulmonary effort is normal.      Comments: And expiratory wheezes in the posterior lung fields, no rhonchi or other adventitious lung sounds  Lymphadenopathy:      Cervical: No cervical adenopathy.   Skin:     General: Skin is warm and dry.      Capillary Refill: Capillary refill takes less than 2 seconds.   Neurological:      Mental Status: He is alert and oriented to person, place, and time.         Assessment/Plan:     Diagnosis and associated orders:     1. Bronchospasm with bronchitis, acute  COVID/SARS COV-2 PCR    predniSONE (DELTASONE) 10 MG Tab    albuterol 108 (90 Base) MCG/ACT Aero Soln inhalation aerosol   2. Cough  COVID/SARS COV-2 PCR      Comments/MDM:     • Discussed with the patient that I would like to perform an x-ray however the x-ray machine at our facility currently is not working and he does not want to go to [facility].  He does not demonstrate rhonchorous lung sounds and his exam and history is lumbar consistent with some reactive airway disease.  I will put him on a course of " 5 days of prednisone and give him an albuterol inhaler and I gave very strict instructions that if he is not feeling much better by tomorrow that he should return to clinic to have imaging performed as the current medications are not effective against pneumonia or another lung infection.  Out of an abundance of caution we will test him for COVID and I will call him with results in 2 to 5 days.           Differential diagnosis, natural history, supportive care, and indications for immediate follow-up discussed.    Advised the patient to follow-up with the primary care physician for recheck, reevaluation, and consideration of further management.    Please note that this dictation was created using voice recognition software. I have made a reasonable attempt to correct obvious errors, but I expect that there are errors of grammar and possibly content that I did not discover before finalizing the note.    This note was electronically signed by Ramiro Isaac PA-C

## 2020-09-19 ENCOUNTER — TELEPHONE (OUTPATIENT)
Dept: URGENT CARE | Facility: PHYSICIAN GROUP | Age: 60
End: 2020-09-19

## 2020-09-19 NOTE — TELEPHONE ENCOUNTER
"Spoke with patient on the phone.  Informed of positive covid.  Pt states he's feeling much better \"90% better\" today after steroids and albuterol.  Instructed to self isolate entire household and f/u with [facility].  ER precautions provided.  Supportive care discussed.    Ramiro Isaac P.A.-C.    "

## 2020-09-30 DIAGNOSIS — U07.1 REAL TIME REVERSE TRANSCRIPTASE PCR POSITIVE FOR COVID-19 VIRUS: ICD-10-CM

## 2020-10-01 ENCOUNTER — HOSPITAL ENCOUNTER (OUTPATIENT)
Dept: LAB | Facility: MEDICAL CENTER | Age: 60
End: 2020-10-01
Attending: FAMILY MEDICINE
Payer: COMMERCIAL

## 2020-10-01 DIAGNOSIS — U07.1 REAL TIME REVERSE TRANSCRIPTASE PCR POSITIVE FOR COVID-19 VIRUS: ICD-10-CM

## 2020-10-01 LAB — COVID ORDER STATUS COVID19: NORMAL

## 2020-10-01 PROCEDURE — C9803 HOPD COVID-19 SPEC COLLECT: HCPCS

## 2020-10-01 PROCEDURE — U0003 INFECTIOUS AGENT DETECTION BY NUCLEIC ACID (DNA OR RNA); SEVERE ACUTE RESPIRATORY SYNDROME CORONAVIRUS 2 (SARS-COV-2) (CORONAVIRUS DISEASE [COVID-19]), AMPLIFIED PROBE TECHNIQUE, MAKING USE OF HIGH THROUGHPUT TECHNOLOGIES AS DESCRIBED BY CMS-2020-01-R: HCPCS

## 2020-10-02 ENCOUNTER — TELEPHONE (OUTPATIENT)
Dept: URGENT CARE | Facility: PHYSICIAN GROUP | Age: 60
End: 2020-10-02

## 2020-10-02 LAB
SARS-COV-2 RNA RESP QL NAA+PROBE: DETECTED
SPECIMEN SOURCE: ABNORMAL

## 2020-10-06 ENCOUNTER — TELEPHONE (OUTPATIENT)
Dept: URGENT CARE | Facility: PHYSICIAN GROUP | Age: 60
End: 2020-10-06

## 2020-10-06 DIAGNOSIS — U07.1 REAL TIME REVERSE TRANSCRIPTASE PCR POSITIVE FOR COVID-19 VIRUS: ICD-10-CM

## 2020-10-06 NOTE — TELEPHONE ENCOUNTER
Pt has had two positive COVID-19 results, and is requesting a third test. Pt advised Urgent Care does not do follow up testing.     Pt requesting another test to obtain a negative result for work.

## 2020-10-07 NOTE — TELEPHONE ENCOUNTER
Please advise pt repeat covid pcr test ordered. Please advise him how to get this scheduled through the lab.   Afshan Bhat M.D.    Message text

## 2020-10-07 NOTE — TELEPHONE ENCOUNTER
I have called and spoke with patient to inform him of this. Patient stated he is tired of testing and is frustrated with this job. He doesn't want to complete an additional lab and will go to work since she was cleared from [facility] already.    I have informed patient I understand his frustration, I will leave the lab in there is he chooses to he may call [phone] to schedule his PCR test.

## 2021-01-12 DIAGNOSIS — K58.0 IRRITABLE BOWEL SYNDROME WITH DIARRHEA: ICD-10-CM

## 2021-01-12 RX ORDER — DICYCLOMINE HYDROCHLORIDE 10 MG/1
CAPSULE ORAL
Qty: 120 CAP | Refills: 3 | Status: SHIPPED | OUTPATIENT
Start: 2021-01-12 | End: 2021-02-01 | Stop reason: SDUPTHER

## 2021-02-01 DIAGNOSIS — K58.0 IRRITABLE BOWEL SYNDROME WITH DIARRHEA: ICD-10-CM

## 2021-02-01 DIAGNOSIS — Z87.19 H/O GASTROESOPHAGEAL REFLUX (GERD): ICD-10-CM

## 2021-02-02 RX ORDER — DEXLANSOPRAZOLE 60 MG/1
1 CAPSULE, DELAYED RELEASE ORAL
Qty: 90 CAP | Refills: 3 | Status: SHIPPED | OUTPATIENT
Start: 2021-02-02 | End: 2022-01-04 | Stop reason: SDUPTHER

## 2021-02-02 RX ORDER — DICYCLOMINE HYDROCHLORIDE 10 MG/1
CAPSULE ORAL
Qty: 120 CAP | Refills: 3 | Status: SHIPPED | OUTPATIENT
Start: 2021-02-02 | End: 2021-08-26 | Stop reason: SDUPTHER

## 2021-04-12 ENCOUNTER — OFFICE VISIT (OUTPATIENT)
Dept: URGENT CARE | Facility: PHYSICIAN GROUP | Age: 61
End: 2021-04-12
Payer: COMMERCIAL

## 2021-04-12 VITALS
RESPIRATION RATE: 12 BRPM | HEIGHT: 69 IN | HEART RATE: 75 BPM | WEIGHT: 161 LBS | OXYGEN SATURATION: 98 % | TEMPERATURE: 97.6 F | BODY MASS INDEX: 23.85 KG/M2 | DIASTOLIC BLOOD PRESSURE: 82 MMHG | SYSTOLIC BLOOD PRESSURE: 136 MMHG

## 2021-04-12 DIAGNOSIS — Z76.0 ENCOUNTER FOR MEDICATION REFILL: ICD-10-CM

## 2021-04-12 DIAGNOSIS — K64.9 HEMORRHOIDS, UNSPECIFIED HEMORRHOID TYPE: ICD-10-CM

## 2021-04-12 PROCEDURE — 99213 OFFICE O/P EST LOW 20 MIN: CPT | Performed by: FAMILY MEDICINE

## 2021-04-12 RX ORDER — HYDROCORTISONE ACETATE PRAMOXINE HCL 2.5; 1 G/100G; G/100G
CREAM TOPICAL
Qty: 30 G | Refills: 5 | Status: SHIPPED | OUTPATIENT
Start: 2021-04-12 | End: 2021-04-27 | Stop reason: SDUPTHER

## 2021-04-12 RX ORDER — HYDROCORTISONE ACETATE PRAMOXINE HCL 1; 1 G/100G; G/100G
CREAM TOPICAL
Qty: 28.4 G | Refills: 5 | Status: CANCELLED | OUTPATIENT
Start: 2021-04-12

## 2021-04-12 ASSESSMENT — FIBROSIS 4 INDEX: FIB4 SCORE: 0.94

## 2021-04-12 NOTE — PROGRESS NOTES
Chief Complaint:    Chief Complaint   Patient presents with   • Medication Refill     Analpram HC 2.5-1% Cream        History of Present Illness:    He brings in box of Analpram 2.5-1% cream, 30 g, that he uses TID prn hemorrhoids which works well. He tried to get RF from pharmacy, but was told he needs to get new Rx. Nothing new regarding his hemorrhoids.      Past Medical History:    History reviewed. No pertinent past medical history.    Past Surgical History:    History reviewed. No pertinent surgical history.    Social History:    Social History     Socioeconomic History   • Marital status: Single     Spouse name: Not on file   • Number of children: Not on file   • Years of education: Not on file   • Highest education level: Not on file   Occupational History   • Not on file   Tobacco Use   • Smoking status: Current Every Day Smoker     Packs/day: 1.50     Years: 30.00     Pack years: 45.00   • Smokeless tobacco: Former User   Substance and Sexual Activity   • Alcohol use: Yes   • Drug use: Never   • Sexual activity: Not on file   Other Topics Concern   • Not on file   Social History Narrative   • Not on file     Social Determinants of Health     Financial Resource Strain:    • Difficulty of Paying Living Expenses:    Food Insecurity:    • Worried About Running Out of Food in the Last Year:    • Ran Out of Food in the Last Year:    Transportation Needs:    • Lack of Transportation (Medical):    • Lack of Transportation (Non-Medical):    Physical Activity:    • Days of Exercise per Week:    • Minutes of Exercise per Session:    Stress:    • Feeling of Stress :    Social Connections:    • Frequency of Communication with Friends and Family:    • Frequency of Social Gatherings with Friends and Family:    • Attends Sikh Services:    • Active Member of Clubs or Organizations:    • Attends Club or Organization Meetings:    • Marital Status:    Intimate Partner Violence:    • Fear of Current or Ex-Partner:    •  "Emotionally Abused:    • Physically Abused:    • Sexually Abused:      Family History:    History reviewed. No pertinent family history.    Medications:    Current Outpatient Medications on File Prior to Visit   Medication Sig Dispense Refill   • DEXILANT 60 MG CAPSULE DELAYED RELEASE delayed-release capsule Take 1 Cap by mouth every day. 90 Cap 3   • dyclonine (SUCRETS) 2 MG Lozenge      • dicyclomine (BENTYL) 10 MG Cap TAKE ONE CAPSULE BY MOUTH FOUR TIMES DAILY( BEFORE MEALS AND AT BEDTIME) 120 Cap 3     No current facility-administered medications on file prior to visit.     Allergies:    No Known Allergies      Vitals:    Vitals:    04/12/21 1325   BP: 136/82   Pulse: 75   Resp: 12   Temp: 36.4 °C (97.6 °F)   TempSrc: Temporal   SpO2: 98%   Weight: 73 kg (161 lb)   Height: 1.753 m (5' 9\")       Physical Exam:    Constitutional: Vital signs reviewed. Appears well-developed and well-nourished. No acute distress.   Eyes: Sclera white, conjunctivae clear.   ENT: External ears normal. Hearing normal.   Neck: Neck supple.   Pulmonary/Chest: Respirations non-labored.  Musculoskeletal: Normal gait. No muscular atrophy or weakness.  Neurological: Alert and oriented to person, place, and time.   Psychiatric: Normal mood and affect. Behavior is normal. Judgment and thought content normal.       Assessment / Plan:    1. Hemorrhoids, unspecified hemorrhoid type  - hydrocortisone-pramoxine (PERIANAL) 2.5-1 % rectal cream; APPLY TO HEMORRHOID 3 TIMES A DAY ONLY IF NEEDED.  Dispense: 30 g; Refill: 5    2. Encounter for medication refill  - hydrocortisone-pramoxine (PERIANAL) 2.5-1 % rectal cream; APPLY TO HEMORRHOID 3 TIMES A DAY ONLY IF NEEDED.  Dispense: 30 g; Refill: 5      Discussed with him DDX, management options, and risks, benefits, and alternatives to treatment plan agreed upon.    Requested medication refilled.    He will return to urgent care if needed.  "

## 2021-04-27 ENCOUNTER — OFFICE VISIT (OUTPATIENT)
Dept: MEDICAL GROUP | Facility: PHYSICIAN GROUP | Age: 61
End: 2021-04-27
Payer: COMMERCIAL

## 2021-04-27 VITALS
SYSTOLIC BLOOD PRESSURE: 140 MMHG | DIASTOLIC BLOOD PRESSURE: 66 MMHG | BODY MASS INDEX: 23.55 KG/M2 | OXYGEN SATURATION: 95 % | HEIGHT: 69 IN | RESPIRATION RATE: 16 BRPM | HEART RATE: 89 BPM | TEMPERATURE: 99.1 F | WEIGHT: 159 LBS

## 2021-04-27 DIAGNOSIS — Z76.0 ENCOUNTER FOR MEDICATION REFILL: ICD-10-CM

## 2021-04-27 DIAGNOSIS — Z87.442 HISTORY OF KIDNEY STONES: ICD-10-CM

## 2021-04-27 DIAGNOSIS — F17.210 CIGARETTE NICOTINE DEPENDENCE WITHOUT COMPLICATION: ICD-10-CM

## 2021-04-27 DIAGNOSIS — Z12.12 SCREENING FOR COLORECTAL CANCER: ICD-10-CM

## 2021-04-27 DIAGNOSIS — K64.9 HEMORRHOIDS, UNSPECIFIED HEMORRHOID TYPE: ICD-10-CM

## 2021-04-27 DIAGNOSIS — Z12.11 SCREENING FOR COLORECTAL CANCER: ICD-10-CM

## 2021-04-27 DIAGNOSIS — R20.0 NUMBNESS OF RIGHT ANTERIOR THIGH: ICD-10-CM

## 2021-04-27 PROCEDURE — 99214 OFFICE O/P EST MOD 30 MIN: CPT | Performed by: FAMILY MEDICINE

## 2021-04-27 RX ORDER — HYDROCORTISONE ACETATE, PRAMOXINE HCL 2.5; 1 G/100G; G/100G
CREAM TOPICAL
COMMUNITY
Start: 2021-04-12 | End: 2021-04-27

## 2021-04-27 RX ORDER — HYDROCORTISONE ACETATE, PRAMOXINE HCL 2.5; 1 G/100G; G/100G
CREAM TOPICAL
COMMUNITY
End: 2022-07-10

## 2021-04-27 RX ORDER — HYDROCORTISONE ACETATE PRAMOXINE HCL 2.5; 1 G/100G; G/100G
CREAM TOPICAL
Qty: 30 G | Refills: 5 | Status: SHIPPED | OUTPATIENT
Start: 2021-04-27 | End: 2021-12-28

## 2021-04-27 ASSESSMENT — PATIENT HEALTH QUESTIONNAIRE - PHQ9: CLINICAL INTERPRETATION OF PHQ2 SCORE: 0

## 2021-04-27 ASSESSMENT — FIBROSIS 4 INDEX: FIB4 SCORE: 0.94

## 2021-04-27 NOTE — ASSESSMENT & PLAN NOTE
7 years ago patient had a severe bout of kidney stones where the clots after the kidney stone passed clogged up his kidneys.  He needed 1 stent placed, he thinks it was in the right side.  He had severe pain with this episode.  Patient since then has no recurrence of kidney stones, no pain with urination no blood in the urine.  He stays well-hydrated.  We will check urinalysis and comprehensive metabolic panel.

## 2021-04-27 NOTE — ASSESSMENT & PLAN NOTE
Patient does currently smoke he has 40 pack years he recalls that a pulmonary nodule was seen in the past on imaging, he had a follow-up imaging done twice and the nodule was stable.  Patient does qualify for CT lung cancer screening program referral placed.

## 2021-04-27 NOTE — ASSESSMENT & PLAN NOTE
For 2 months numbness of the right anterior thigh occurred suddenly and has persisted.  No recent trauma, mild chronic back pain nothing severe or worsening.  No pain radiating down his legs no weakness or loss of strength.  Aching pain in the right thigh will wake him up in the middle of the night improved with flexion.  Explained to patient this is in the lateral femoral cutaneous nerve distribution.  He does not wear tight clothing or belts.  Patient reassured this is not any severe back problem or spine problem.  Since the problem has been persistent for 2 months with no improvement and adversely affecting his quality of life due to him waking at night with the pain.  Referral done to PM&R for possible nerve block/EMG to help with treatment.

## 2021-04-29 ENCOUNTER — TELEPHONE (OUTPATIENT)
Dept: MEDICAL GROUP | Facility: PHYSICIAN GROUP | Age: 61
End: 2021-04-29

## 2021-04-29 ENCOUNTER — TELEPHONE (OUTPATIENT)
Dept: HEMATOLOGY ONCOLOGY | Facility: MEDICAL CENTER | Age: 61
End: 2021-04-29

## 2021-04-29 NOTE — TELEPHONE ENCOUNTER
Received referral to lung cancer screening program.  Chart review to assess for lung cancer screening program eligibility.   1. Age 55-77 yrs of age? Yes 61 y.o.  2. 30 pack year hx of smoking, or greater? Yes 1.5 [id number]= [id number] hx  3. Current smoker or if quit, has pt quit within last 15 yrs?Yes  Current smoker  4. Any signs or symptoms of lung cancer? None noted  5. Previous history of lung cancer? None noted  6. Chest CT within past 12 mos.? None noted  Patient does meet eligibility criteria. [facility] scheduling notified to schedule the shared decision making visit.

## 2021-04-30 NOTE — PROGRESS NOTES
Subjective:   Deandre Barr is a 61 y.o. male here today for evaluation and management of:     History of kidney stones  7 years ago patient had a severe bout of kidney stones where the clots after the kidney stone passed clogged up his kidneys.  He needed 1 stent placed, he thinks it was in the right side.  He had severe pain with this episode.  Patient since then has no recurrence of kidney stones, no pain with urination no blood in the urine.  He stays well-hydrated.  We will check urinalysis and comprehensive metabolic panel.    Cigarette nicotine dependence without complication  Patient does currently smoke he has 40 pack years he recalls that a pulmonary nodule was seen in the past on imaging, he had a follow-up imaging done twice and the nodule was stable.  Patient does qualify for CT lung cancer screening program referral placed.    Numbness of right anterior thigh  For 2 months numbness of the right anterior thigh occurred suddenly and has persisted.  No recent trauma, mild chronic back pain nothing severe or worsening.  No pain radiating down his legs no weakness or loss of strength.  Aching pain in the right thigh will wake him up in the middle of the night improved with flexion.  Explained to patient this is in the lateral femoral cutaneous nerve distribution.  He does not wear tight clothing or belts.  Patient reassured this is not any severe back problem or spine problem.  Since the problem has been persistent for 2 months with no improvement and adversely affecting his quality of life due to him waking at night with the pain.  Referral done to PM&R for possible nerve block/EMG to help with treatment.           Current medicines (including changes today)  Current Outpatient Medications   Medication Sig Dispense Refill   • hydrocortisone-pramoxine (PERIANAL) 2.5-1 % rectal cream APPLY TO HEMORRHOID 3 TIMES A DAY ONLY IF NEEDED. 30 g 5   • dicyclomine (BENTYL) 10 MG Cap TAKE ONE CAPSULE BY MOUTH FOUR  "TIMES DAILY( BEFORE MEALS AND AT BEDTIME) 120 Cap 3   • DEXILANT 60 MG CAPSULE DELAYED RELEASE delayed-release capsule Take 1 Cap by mouth every day. 90 Cap 3     No current facility-administered medications for this visit.     He  has no past medical history on file.    ROS  No chest pain, no shortness of breath, no abdominal pain       Objective:     /66   Pulse 89   Temp 37.3 °C (99.1 °F) (Temporal)   Resp 16   Ht 1.753 m (5' 9\")   Wt 72.1 kg (159 lb)   SpO2 95%  Body mass index is 23.48 kg/m².   Physical Exam:  Constitutional: Alert, no distress.  Skin: Warm, dry, good turgor, no rashes in visible areas.  Eye: Equal, round and reactive, conjunctiva clear, lids normal.  ENMT: Lips without lesions, good dentition, oropharynx clear.  Neck: Trachea midline, no masses, no thyromegaly. No cervical or supraclavicular lymphadenopathy  Respiratory: Unlabored respiratory effort, lungs clear to auscultation, no wheezes, no ronchi.  Cardiovascular: Normal S1, S2, no murmur, no edema.  Abdomen: Soft, non-tender, no masses, no hepatosplenomegaly.  Psych: Alert and oriented x3, normal affect and mood.        Assessment and Plan:   The following treatment plan was discussed    1. Hemorrhoids, unspecified hemorrhoid type    - hydrocortisone-pramoxine (PERIANAL) 2.5-1 % rectal cream; APPLY TO HEMORRHOID 3 TIMES A DAY ONLY IF NEEDED.  Dispense: 30 g; Refill: 5    2. Encounter for medication refill    - hydrocortisone-pramoxine (PERIANAL) 2.5-1 % rectal cream; APPLY TO HEMORRHOID 3 TIMES A DAY ONLY IF NEEDED.  Dispense: 30 g; Refill: 5    3. History of kidney stones    - Comp Metabolic Panel; Future  - URINALYSIS,CULTURE IF INDICATED; Future    4. Cigarette nicotine dependence without complication    - REFERRAL TO LUNG CANCER SCREENING PROGRAM; Future    5. Screening for colorectal cancer    - REFERRAL TO GI FOR COLONOSCOPY    6. Numbness of right anterior thigh  - REFERRAL TO OUTPATIENT INTERVENTIONAL PAIN " CLINIC      Followup: Return in about 6 months (around 10/27/2021) for thigh numbness, hx of kidney stones.

## 2021-05-13 ENCOUNTER — TELEMEDICINE (OUTPATIENT)
Dept: HEMATOLOGY ONCOLOGY | Facility: MEDICAL CENTER | Age: 61
End: 2021-05-13
Payer: COMMERCIAL

## 2021-05-13 VITALS — BODY MASS INDEX: 22.96 KG/M2 | WEIGHT: 155 LBS | HEIGHT: 69 IN

## 2021-05-13 DIAGNOSIS — F17.210 CIGARETTE SMOKER: ICD-10-CM

## 2021-05-13 PROCEDURE — G0296 VISIT TO DETERM LDCT ELIG: HCPCS | Mod: 95 | Performed by: NURSE PRACTITIONER

## 2021-05-13 ASSESSMENT — ENCOUNTER SYMPTOMS
COUGH: 0
WHEEZING: 0
WEIGHT LOSS: 0
SHORTNESS OF BREATH: 1

## 2021-05-13 ASSESSMENT — FIBROSIS 4 INDEX: FIB4 SCORE: 0.94

## 2021-05-13 NOTE — PROGRESS NOTES
"Subjective:      Deandre Barr is a 61 y.o. male who presents with Lung Cancer Screening Program Prescreen ([facility] Dep/Afshan Bhat) for lung cancer screening shared decision making visit.           HPI    Patient seen today for initial lung cancer screening visit. Patient referred by his PCP Dr. Baht. Visit is conducted as an on demand video visit using Zoom and patient is unaccompanied today.    The patient meets eligibility criteria including age, smoking history (30+ pack years), if former smoker, quit in the last 15 years, and absence of signs or symptoms of lung cancer.    - Age - 61  - Smoking history - Patient has smoked for 40 years at an average of 1.5 ppd = 60 pack year smoking history.  - Current smoking status - Current smoker and not interested in quitting.  - No symptoms of lung cancer and no previous history of lung cancer       Previously about 10 years ago patient had a lung nodule, which he believes to be in the LLL. It was monitored down in [facility] and he had imaging here in [place] as well. It was stable and he stopped monitoring it about 4 years ago.     No Known Allergies  Current Outpatient Medications on File Prior to Visit   Medication Sig Dispense Refill   • hydrocortisone-pramoxine (PERIANAL) 2.5-1 % rectal cream APPLY TO HEMORRHOID 3 TIMES A DAY ONLY IF NEEDED. 30 g 5   • dicyclomine (BENTYL) 10 MG Cap TAKE ONE CAPSULE BY MOUTH FOUR TIMES DAILY( BEFORE MEALS AND AT BEDTIME) 120 Cap 3   • DEXILANT 60 MG CAPSULE DELAYED RELEASE delayed-release capsule Take 1 Cap by mouth every day. 90 Cap 3     No current facility-administered medications on file prior to visit.       Review of Systems   Constitutional: Negative for malaise/fatigue and weight loss.   Respiratory: Positive for shortness of breath (with activity at times). Negative for cough and wheezing.           Objective:     Ht 1.753 m (5' 9\")   Wt 70.3 kg (155 lb)   BMI 22.89 kg/m²      Physical Exam  Pulmonary:      " Effort: Pulmonary effort is normal.   Neurological:      Mental Status: He is oriented to person, place, and time.   Psychiatric:         Mood and Affect: Mood normal.         Behavior: Behavior normal.                Assessment/Plan:        1. Cigarette smoker  CT-LUNG CANCER-SCREENING       We conducted a shared decision-making process using a decision aid. We reviewed benefits and harms of screening, including false positives and potential need for additional diagnostic testing, the possibility of over diagnosis, and total radiation exposure.    We discussed the importance of adhering to annual LDCT screening. We also discussed the impact of comorbities on the patient's the ability or willingness to undergo diagnostic procedure(s) and treatment.    Counseling on the importance of maintaining cigarette smoking abstinence if former smoker; or the importance of smoking cessation if current smoker and, if appropriate, furnishing of information about tobacco cessation interventions. I provided patient with smoking cessation materials and resources within [facility] and the community. Patient appreciative of the resources.     Based on our discussion, we have decided to begin annual lung cancer screening starting now.        This evaluation was conducted via Zoom using secure and encrypted videoconferencing technology. The patient was in a private location in the [facility].    The patient's identity was confirmed and verbal consent was obtained for this virtual visit.

## 2021-05-26 ENCOUNTER — TELEPHONE (OUTPATIENT)
Dept: HEMATOLOGY ONCOLOGY | Facility: MEDICAL CENTER | Age: 61
End: 2021-05-26

## 2021-05-26 NOTE — TELEPHONE ENCOUNTER
Arianna from [employer] called regarding MRN [id number], Deandre Barr.  Arianna submitted auth for the scan, but it was denied by insurance.  Reason: Insurance does not cover this service.  Arianna cancelled the scan, and notified the patient.

## 2021-05-28 ENCOUNTER — HOSPITAL ENCOUNTER (OUTPATIENT)
Dept: RADIOLOGY | Facility: MEDICAL CENTER | Age: 61
End: 2021-05-28
Attending: NURSE PRACTITIONER
Payer: COMMERCIAL

## 2021-08-26 DIAGNOSIS — K58.0 IRRITABLE BOWEL SYNDROME WITH DIARRHEA: ICD-10-CM

## 2021-08-26 RX ORDER — DICYCLOMINE HYDROCHLORIDE 10 MG/1
CAPSULE ORAL
Qty: 120 CAPSULE | Refills: 3 | Status: SHIPPED | OUTPATIENT
Start: 2021-08-26 | End: 2022-01-04 | Stop reason: SDUPTHER

## 2021-12-28 ENCOUNTER — OFFICE VISIT (OUTPATIENT)
Dept: URGENT CARE | Facility: CLINIC | Age: 61
End: 2021-12-28
Payer: COMMERCIAL

## 2021-12-28 VITALS
SYSTOLIC BLOOD PRESSURE: 122 MMHG | WEIGHT: 168.21 LBS | TEMPERATURE: 98.9 F | OXYGEN SATURATION: 97 % | HEART RATE: 79 BPM | HEIGHT: 66 IN | RESPIRATION RATE: 16 BRPM | BODY MASS INDEX: 27.03 KG/M2 | DIASTOLIC BLOOD PRESSURE: 70 MMHG

## 2021-12-28 DIAGNOSIS — R05.9 COUGH: ICD-10-CM

## 2021-12-28 DIAGNOSIS — H66.91 RIGHT OTITIS MEDIA, UNSPECIFIED OTITIS MEDIA TYPE: ICD-10-CM

## 2021-12-28 DIAGNOSIS — R09.81 NASAL CONGESTION: ICD-10-CM

## 2021-12-28 DIAGNOSIS — U07.1 COVID-19: ICD-10-CM

## 2021-12-28 LAB
EXTERNAL QUALITY CONTROL: NORMAL
SARS-COV+SARS-COV-2 AG RESP QL IA.RAPID: POSITIVE

## 2021-12-28 PROCEDURE — 99213 OFFICE O/P EST LOW 20 MIN: CPT | Mod: CS | Performed by: FAMILY MEDICINE

## 2021-12-28 PROCEDURE — 87426 SARSCOV CORONAVIRUS AG IA: CPT | Mod: QW | Performed by: FAMILY MEDICINE

## 2021-12-28 RX ORDER — PROMETHAZINE HYDROCHLORIDE AND CODEINE PHOSPHATE 6.25; 1 MG/5ML; MG/5ML
5 SYRUP ORAL 3 TIMES DAILY PRN
Qty: 120 ML | Refills: 0 | Status: SHIPPED | OUTPATIENT
Start: 2021-12-28 | End: 2022-01-07

## 2021-12-28 RX ORDER — AMOXICILLIN 875 MG/1
875 TABLET, COATED ORAL 2 TIMES DAILY
Qty: 14 TABLET | Refills: 0 | Status: SHIPPED | OUTPATIENT
Start: 2021-12-28 | End: 2022-01-04

## 2021-12-28 ASSESSMENT — ENCOUNTER SYMPTOMS
EYE REDNESS: 0
VOMITING: 0
MYALGIAS: 0
DIARRHEA: 0
WEIGHT LOSS: 0
EYE DISCHARGE: 0

## 2021-12-28 NOTE — PROGRESS NOTES
"Georgette Barr is a 61 y.o. male who presents with Otalgia (x 3 days) and Headache            3 days bilateral earache and nasal congestion. No drainage from ears. No recent swimming. PMH otitis media. HA. Productive cough without blood in sputum. No fever. No SOB/wheeze. No loss of taste or smell. c19 unvaccinated. Prior c19 infection approx 8 months ago. No other aggravating or alleviating factors.        Review of Systems   Constitutional: Negative for malaise/fatigue and weight loss.   Eyes: Negative for discharge and redness.   Gastrointestinal: Negative for diarrhea and vomiting.   Musculoskeletal: Negative for joint pain and myalgias.   Skin: Negative for itching and rash.              Objective     /70   Pulse 79   Temp 37.2 °C (98.9 °F) (Temporal)   Resp 16   Ht 1.676 m (5' 6\")   Wt 76.3 kg (168 lb 3.4 oz)   SpO2 97%   BMI 27.15 kg/m²      Physical Exam  Constitutional:       General: He is not in acute distress.     Appearance: He is well-developed.   HENT:      Head: Normocephalic and atraumatic.      Ears:      Comments: Right TM red dull and bulging, left TM bulging  Eyes:      Conjunctiva/sclera: Conjunctivae normal.   Cardiovascular:      Rate and Rhythm: Normal rate and regular rhythm.      Heart sounds: Normal heart sounds. No murmur heard.      Pulmonary:      Effort: Pulmonary effort is normal.      Breath sounds: Normal breath sounds. No wheezing.   Skin:     General: Skin is warm and dry.      Findings: No rash.   Neurological:      Mental Status: He is alert and oriented to person, place, and time.                             Assessment & Plan             1. Right otitis media, unspecified otitis media type  amoxicillin (AMOXIL) 875 MG tablet   2. Cough  promethazine-codeine (PHENERGAN-CODEINE) 6.25-10 MG/5ML Syrup    POCT SARS-COV Antigen YVAN (Symptomatic Only)    CANCELED: COVID/SARS CoV-2 PCR   3. Nasal congestion  POCT SARS-COV Antigen YVAN (Symptomatic Only) "    CANCELED: COVID/SARS CoV-2 PCR   4. COVID-19         Differential diagnosis, natural history, supportive care, and indications for immediate follow-up discussed at length.     Self isolate per cdc protocol.

## 2021-12-29 ENCOUNTER — PATIENT MESSAGE (OUTPATIENT)
Dept: URGENT CARE | Facility: CLINIC | Age: 61
End: 2021-12-29

## 2021-12-29 NOTE — TELEPHONE ENCOUNTER
From: Deandre Barr  To: Physician Silas Raymundo  Sent: 12/29/2021 11:44 AM PST  Subject: Covid test    Need need covid test results for 12/28/21  Thank you

## 2022-01-04 DIAGNOSIS — K58.0 IRRITABLE BOWEL SYNDROME WITH DIARRHEA: ICD-10-CM

## 2022-01-04 DIAGNOSIS — Z87.19 H/O GASTROESOPHAGEAL REFLUX (GERD): ICD-10-CM

## 2022-01-04 RX ORDER — DICYCLOMINE HYDROCHLORIDE 10 MG/1
CAPSULE ORAL
Qty: 360 CAPSULE | Refills: 3 | Status: SHIPPED | OUTPATIENT
Start: 2022-01-04 | End: 2022-10-20 | Stop reason: SDUPTHER

## 2022-01-04 RX ORDER — DEXLANSOPRAZOLE 60 MG/1
1 CAPSULE, DELAYED RELEASE ORAL
Qty: 90 CAPSULE | Refills: 3 | Status: SHIPPED | OUTPATIENT
Start: 2022-01-04 | End: 2022-10-20 | Stop reason: SDUPTHER

## 2022-01-04 NOTE — TELEPHONE ENCOUNTER
Received request via: Patient    Was the patient seen in the last year in this department? Yes   LAST OV 4/27/21      Does the patient have an active prescription (recently filled or refills available) for medication(s) requested? No     PATIENT IS OUT OF MEDICATION    Requested Prescriptions     Pending Prescriptions Disp Refills   • dicyclomine (BENTYL) 10 MG Cap 120 Capsule 3     Sig: TAKE ONE CAPSULE BY MOUTH FOUR TIMES DAILY( BEFORE MEALS AND AT BEDTIME)   • DEXILANT 60 MG CAPSULE DELAYED RELEASE delayed-release capsule 90 Capsule 3     Sig: Take 1 Capsule by mouth every day.

## 2022-02-08 ENCOUNTER — APPOINTMENT (OUTPATIENT)
Dept: RADIOLOGY | Facility: MEDICAL CENTER | Age: 62
End: 2022-02-08
Attending: ORTHOPAEDIC SURGERY
Payer: COMMERCIAL

## 2022-02-08 DIAGNOSIS — M25.512 LEFT SHOULDER PAIN, UNSPECIFIED CHRONICITY: ICD-10-CM

## 2022-02-08 PROCEDURE — 73221 MRI JOINT UPR EXTREM W/O DYE: CPT | Mod: LT

## 2022-07-12 RX ORDER — HYDROCORTISONE ACETATE, PRAMOXINE HCL 2.5; 1 G/100G; G/100G
CREAM TOPICAL
Qty: 30 G | Refills: 0 | OUTPATIENT
Start: 2022-07-12

## 2022-10-20 ENCOUNTER — OFFICE VISIT (OUTPATIENT)
Dept: MEDICAL GROUP | Facility: PHYSICIAN GROUP | Age: 62
End: 2022-10-20
Payer: COMMERCIAL

## 2022-10-20 VITALS
HEIGHT: 69 IN | SYSTOLIC BLOOD PRESSURE: 118 MMHG | RESPIRATION RATE: 16 BRPM | TEMPERATURE: 97.9 F | BODY MASS INDEX: 23.55 KG/M2 | OXYGEN SATURATION: 95 % | WEIGHT: 159 LBS | HEART RATE: 85 BPM | DIASTOLIC BLOOD PRESSURE: 78 MMHG

## 2022-10-20 DIAGNOSIS — F17.210 CIGARETTE NICOTINE DEPENDENCE WITHOUT COMPLICATION: ICD-10-CM

## 2022-10-20 DIAGNOSIS — K64.8 OTHER HEMORRHOIDS: ICD-10-CM

## 2022-10-20 DIAGNOSIS — Z12.11 SCREENING FOR COLORECTAL CANCER: ICD-10-CM

## 2022-10-20 DIAGNOSIS — K64.9 HEMORRHOIDS, UNSPECIFIED HEMORRHOID TYPE: ICD-10-CM

## 2022-10-20 DIAGNOSIS — Z87.442 HISTORY OF KIDNEY STONES: ICD-10-CM

## 2022-10-20 DIAGNOSIS — Z23 NEED FOR VACCINATION: ICD-10-CM

## 2022-10-20 DIAGNOSIS — K58.0 IRRITABLE BOWEL SYNDROME WITH DIARRHEA: ICD-10-CM

## 2022-10-20 DIAGNOSIS — Z87.19 H/O GASTROESOPHAGEAL REFLUX (GERD): ICD-10-CM

## 2022-10-20 DIAGNOSIS — Z13.220 SCREENING, LIPID: ICD-10-CM

## 2022-10-20 DIAGNOSIS — Z12.12 SCREENING FOR COLORECTAL CANCER: ICD-10-CM

## 2022-10-20 PROCEDURE — 99214 OFFICE O/P EST MOD 30 MIN: CPT | Performed by: FAMILY MEDICINE

## 2022-10-20 RX ORDER — CLOSTRIDIUM TETANI TOXOID ANTIGEN (FORMALDEHYDE INACTIVATED), CORYNEBACTERIUM DIPHTHERIAE TOXOID ANTIGEN (FORMALDEHYDE INACTIVATED), BORDETELLA PERTUSSIS TOXOID ANTIGEN (GLUTARALDEHYDE INACTIVATED), BORDETELLA PERTUSSIS FILAMENTOUS HEMAGGLUTININ ANTIGEN (FORMALDEHYDE INACTIVATED), BORDETELLA PERTUSSIS PERTACTIN ANTIGEN, AND BORDETELLA PERTUSSIS FIMBRIAE 2/3 ANTIGEN 5; 2; 2.5; 5; 3; 5 [LF]/.5ML; [LF]/.5ML; UG/.5ML; UG/.5ML; UG/.5ML; UG/.5ML
0.5 INJECTION, SUSPENSION INTRAMUSCULAR ONCE
Qty: 0.5 ML | Refills: 0 | Status: SHIPPED
Start: 2022-10-20 | End: 2022-10-20

## 2022-10-20 RX ORDER — DICYCLOMINE HYDROCHLORIDE 10 MG/1
CAPSULE ORAL
Qty: 360 CAPSULE | Refills: 3 | Status: SHIPPED | OUTPATIENT
Start: 2022-10-20 | End: 2022-11-16 | Stop reason: SDUPTHER

## 2022-10-20 RX ORDER — DEXLANSOPRAZOLE 60 MG/1
1 CAPSULE, DELAYED RELEASE ORAL
Qty: 90 CAPSULE | Refills: 3 | Status: SHIPPED | OUTPATIENT
Start: 2022-10-20 | End: 2022-11-16 | Stop reason: SDUPTHER

## 2022-10-20 RX ORDER — HYDROCORTISONE ACETATE, PRAMOXINE HCL 2.5; 1 G/100G; G/100G
CREAM TOPICAL
Qty: 28.4 G | Refills: 5 | Status: SHIPPED | OUTPATIENT
Start: 2022-10-20 | End: 2022-11-16 | Stop reason: SDUPTHER

## 2022-10-20 ASSESSMENT — PATIENT HEALTH QUESTIONNAIRE - PHQ9: CLINICAL INTERPRETATION OF PHQ2 SCORE: 0

## 2022-10-20 NOTE — PATIENT INSTRUCTIONS
Please get fasting labs, fasting for 8-10 hours before next visit. You can make an appointment for the lab or walk in.   Lab hours [facility] location: Monday to Friday 6 am - 4 pm, Saturday 7 am -noon  Even if you lose your lab paperwork, you can still come in to get your lab done.

## 2022-10-25 PROBLEM — K64.8 OTHER HEMORRHOIDS: Status: ACTIVE | Noted: 2022-10-25

## 2022-10-25 NOTE — PROGRESS NOTES
"Subjective:   Deandre Barr is a 62 y.o. male here today for evaluation and management of:     Irritable bowel syndrome with diarrhea  Chronic condition, stable, controlled with bentyl  Refill provided. Follow up labs ordered.     Other hemorrhoids  Chronic condition, controlled with topical hemorrhoid cream pramoxine HC  Refill provided.   He also has IBS  crc screening due. Referral to GI for colonoscopy provided.                Current medicines (including changes today)  Current Outpatient Medications   Medication Sig Dispense Refill    Pramoxine-HC (HYDROCORTISONE ACE-PRAMOXINE) 2.5-1 % Cream Apply one gram to affected skin three times a day as needed. 28.4 g 5    dicyclomine (BENTYL) 10 MG Cap TAKE ONE CAPSULE BY MOUTH FOUR TIMES DAILY( BEFORE MEALS AND AT BEDTIME) 360 Capsule 3    DEXILANT 60 MG CAPSULE DELAYED RELEASE delayed-release capsule Take 1 Capsule by mouth every day. 90 Capsule 3    naproxen (NAPROSYN) 500 MG Tab TAKE 1 TABLET BY MOUTH TWICE DAILY WITH MEALS 60 Tablet 2     No current facility-administered medications for this visit.     He  has no past medical history on file.    ROS  No chest pain, no shortness of breath, no abdominal pain       Objective:     /78 (BP Location: Left arm, Patient Position: Sitting, BP Cuff Size: Adult)   Pulse 85   Temp 36.6 °C (97.9 °F) (Temporal)   Resp 16   Ht 1.753 m (5' 9\")   Wt 72.1 kg (159 lb)   SpO2 95%  Body mass index is 23.48 kg/m².   Physical Exam:  Constitutional: Alert, no distress.  Skin: Warm, dry, good turgor, no rashes in visible areas.  Eye: Equal, round and reactive, conjunctiva clear, lids normal.  ENMT: Lips without lesions, good dentition, oropharynx clear.  Neck: Trachea midline, no masses, no thyromegaly. No cervical or supraclavicular lymphadenopathy  Respiratory: Unlabored respiratory effort, lungs clear to auscultation, no wheezes, no ronchi.  Cardiovascular: Normal S1, S2, no murmur, no edema.  Abdomen: Soft, non-tender, " no masses, no hepatosplenomegaly.  Psych: Alert and oriented x3, normal affect and mood.        Assessment and Plan:   The following treatment plan was discussed    1. Need for vaccination  - tetanus-dipth-acell pertussis (ADACEL) 5-2-15.5 LF-MCG/0.5 Suspension; Inject 0.5 mL into the shoulder, thigh, or buttocks one time for 1 dose.  Dispense: 0.5 mL; Refill: 0  - Zoster Vac Recomb Adjuvanted (SHINGRIX) 50 MCG/0.5ML Recon Susp; Inject 0.5 mL into the shoulder, thigh, or buttocks one time for 1 dose.  Dispense: 0.5 mL; Refill: 0    2. Screening for colorectal cancer  - Referral to GI for Colonoscopy    3. Cigarette nicotine dependence without complication  - CT-LUNG CANCER-SCREENING; Future  - CBC WITH DIFFERENTIAL; Future    4. Irritable bowel syndrome with diarrhea  - dicyclomine (BENTYL) 10 MG Cap; TAKE ONE CAPSULE BY MOUTH FOUR TIMES DAILY( BEFORE MEALS AND AT BEDTIME)  Dispense: 360 Capsule; Refill: 3  - DEXILANT 60 MG CAPSULE DELAYED RELEASE delayed-release capsule; Take 1 Capsule by mouth every day.  Dispense: 90 Capsule; Refill: 3    5. History of kidney stones  - Comp Metabolic Panel; Future    6. Hemorrhoids, unspecified hemorrhoid type  - CBC WITH DIFFERENTIAL; Future  - Comp Metabolic Panel; Future    7. Screening, lipid  - Lipid Profile; Future    8. H/O gastroesophageal reflux (GERD)  - DEXILANT 60 MG CAPSULE DELAYED RELEASE delayed-release capsule; Take 1 Capsule by mouth every day.  Dispense: 90 Capsule; Refill: 3    9. Other hemorrhoids    Other orders  - Pramoxine-HC (HYDROCORTISONE ACE-PRAMOXINE) 2.5-1 % Cream; Apply one gram to affected skin three times a day as needed.  Dispense: 28.4 g; Refill: 5      Followup: Return in about 3 months (around 1/20/2023) for Lab Review easy bruising of arms, ETOH.

## 2022-10-25 NOTE — ASSESSMENT & PLAN NOTE
Chronic condition, controlled with topical hemorrhoid cream pramoxine HC  Refill provided.   He also has IBS  crc screening due. Referral to GI for colonoscopy provided.

## 2022-11-10 ENCOUNTER — TELEPHONE (OUTPATIENT)
Dept: URGENT CARE | Facility: PHYSICIAN GROUP | Age: 62
End: 2022-11-10

## 2022-11-10 ENCOUNTER — TELEPHONE (OUTPATIENT)
Dept: MEDICAL GROUP | Facility: PHYSICIAN GROUP | Age: 62
End: 2022-11-10
Payer: COMMERCIAL

## 2022-11-10 NOTE — TELEPHONE ENCOUNTER
----- Message from Olivia Scott sent at 10/31/2022  9:22 AM PDT -----  Regarding: med for optum  Patient is waiting for a pre-authorization for hemroid

## 2022-11-10 NOTE — TELEPHONE ENCOUNTER
MEDICATION PRIOR AUTHORIZATION NEEDED:    1. Name of Medication: Pramoxine-HC (HYDROCORTISONE ACE-PRAMOXINE) 2.5-1 % Cream [[id number]]      2. Requested By (Name of Pharmacy): [facility]     3. Is insurance on file current? [employer]     4. What is the name & phone number of the 3rd party payor? N/a   submitted through Cover my meds

## 2022-11-16 DIAGNOSIS — K58.0 IRRITABLE BOWEL SYNDROME WITH DIARRHEA: ICD-10-CM

## 2022-11-16 DIAGNOSIS — Z87.19 H/O GASTROESOPHAGEAL REFLUX (GERD): ICD-10-CM

## 2022-11-16 RX ORDER — DEXLANSOPRAZOLE 60 MG/1
1 CAPSULE, DELAYED RELEASE ORAL
Qty: 90 CAPSULE | Refills: 3 | Status: SHIPPED | OUTPATIENT
Start: 2022-11-16 | End: 2023-08-25

## 2022-11-16 RX ORDER — DICYCLOMINE HYDROCHLORIDE 10 MG/1
CAPSULE ORAL
Qty: 360 CAPSULE | Refills: 3 | Status: SHIPPED | OUTPATIENT
Start: 2022-11-16

## 2022-11-16 RX ORDER — HYDROCORTISONE ACETATE, PRAMOXINE HCL 2.5; 1 G/100G; G/100G
CREAM TOPICAL
Qty: 28.4 G | Refills: 5 | Status: SHIPPED | OUTPATIENT
Start: 2022-11-16

## 2023-04-12 ENCOUNTER — OFFICE VISIT (OUTPATIENT)
Dept: URGENT CARE | Facility: CLINIC | Age: 63
End: 2023-04-12
Payer: COMMERCIAL

## 2023-04-12 VITALS
TEMPERATURE: 98.1 F | WEIGHT: 167.55 LBS | HEIGHT: 69 IN | BODY MASS INDEX: 24.82 KG/M2 | OXYGEN SATURATION: 96 % | DIASTOLIC BLOOD PRESSURE: 80 MMHG | HEART RATE: 76 BPM | SYSTOLIC BLOOD PRESSURE: 142 MMHG | RESPIRATION RATE: 16 BRPM

## 2023-04-12 DIAGNOSIS — H92.02 OTALGIA OF LEFT EAR: ICD-10-CM

## 2023-04-12 PROCEDURE — 99213 OFFICE O/P EST LOW 20 MIN: CPT | Performed by: NURSE PRACTITIONER

## 2023-04-12 ASSESSMENT — ENCOUNTER SYMPTOMS
FEVER: 0
SORE THROAT: 0
HEADACHES: 1
COUGH: 0

## 2023-04-12 NOTE — PATIENT INSTRUCTIONS
-Ibuprofen or tylenol as directed for pain and/or fevers.   -Follow up with primary care provider.    Follow up for failure to improve, worsening symptoms, persistent fevers, ear drainage, rash, dental pain, severe headache, or any other concerns.

## 2023-04-12 NOTE — PROGRESS NOTES
Subjective:     Deandre Barr is a 63 y.o. male who presents for Otalgia (LT sided ear pain, possible infection. )      Patient has concerns for a possible ear infection. States he has tenderness to touch of the outer ear for a few days. Chronic tinnitus. Frontal bilateral headache. No current dental issues. No hx of TMJ. Denies URI or allergy sympotms. States he could have possibly slept on it wrong.     Otalgia   There is pain in the left ear. This is a new problem. There has been no fever. The pain is at a severity of 1/10. Associated symptoms include headaches. Pertinent negatives include no coughing, ear discharge, hearing loss, rash or sore throat.     No past medical history on file.    No past surgical history on file.    Social History     Socioeconomic History    Marital status: Single     Spouse name: Not on file    Number of children: Not on file    Years of education: Not on file    Highest education level: Not on file   Occupational History    Not on file   Tobacco Use    Smoking status: Every Day     Packs/day: 1.50     Years: 40.00     Pack years: 60.00     Types: Cigarettes    Smokeless tobacco: Former   Vaping Use    Vaping Use: Never used   Substance and Sexual Activity    Alcohol use: Yes    Drug use: Never    Sexual activity: Not on file   Other Topics Concern    Not on file   Social History Narrative    Not on file     Social Determinants of Health     Financial Resource Strain: Not on file   Food Insecurity: Not on file   Transportation Needs: Not on file   Physical Activity: Not on file   Stress: Not on file   Social Connections: Not on file   Intimate Partner Violence: Not on file   Housing Stability: Not on file        No family history on file.     No Known Allergies    Review of Systems   Constitutional:  Negative for fever.   HENT:  Positive for ear pain and tinnitus. Negative for ear discharge, hearing loss and sore throat.    Respiratory:  Negative for cough.    Skin:  Negative  "for rash.   Neurological:  Positive for headaches.   All other systems reviewed and are negative.     Objective:   BP (!) 142/80   Pulse 76   Temp 36.7 °C (98.1 °F) (Temporal)   Resp 16   Ht 1.753 m (5' 9\")   Wt 76 kg (167 lb 8.8 oz)   SpO2 96%   BMI 24.74 kg/m²     Physical Exam  Vitals reviewed.   Constitutional:       General: He is not in acute distress.     Appearance: He is not toxic-appearing.   HENT:      Head:      Jaw: No tenderness, pain on movement or malocclusion.      Right Ear: Tympanic membrane, ear canal and external ear normal.      Left Ear: Tympanic membrane and ear canal normal.      Ears:        Comments: Mild tenderness to palpation of tragus. No lesions or pustule formation noted. No rash in the area.      Mouth/Throat:      Lips: Pink.      Pharynx: Oropharynx is clear.   Pulmonary:      Effort: Pulmonary effort is normal. No respiratory distress.   Skin:     General: Skin is warm and dry.      Findings: No bruising, erythema or rash.   Neurological:      General: No focal deficit present.      Mental Status: He is alert and oriented to person, place, and time.      Cranial Nerves: No facial asymmetry.   Psychiatric:         Behavior: Behavior normal.       Assessment/Plan:   1. Otalgia of left ear  -Ibuprofen or tylenol as directed for pain and/or fevers.   -Follow up with primary care provider.    Follow up for failure to improve, worsening symptoms, persistent fevers, ear drainage, rash, dental pain, severe headache, or any other concerns.    -Acute ear pain. No indication of otitis media on exam. No rash. Denies dental or TMJ pain. Discussed pain management with f/u for persistent or worsening symptoms.     Differential diagnosis, natural history, supportive care, and indications for immediate follow-up discussed.  "

## 2023-08-24 DIAGNOSIS — K58.0 IRRITABLE BOWEL SYNDROME WITH DIARRHEA: ICD-10-CM

## 2023-08-24 DIAGNOSIS — Z87.19 H/O GASTROESOPHAGEAL REFLUX (GERD): ICD-10-CM

## 2023-08-25 RX ORDER — DEXLANSOPRAZOLE 60 MG/1
1 CAPSULE, DELAYED RELEASE ORAL DAILY
Qty: 90 CAPSULE | Refills: 3 | Status: SHIPPED | OUTPATIENT
Start: 2023-08-25

## 2023-08-25 NOTE — TELEPHONE ENCOUNTER
Requested Prescriptions     Pending Prescriptions Disp Refills    DEXILANT 60 MG CAPSULE DELAYED RELEASE delayed-release capsule [Pharmacy Med Name: Dexilant 60 MG Oral Capsule Delayed Release] 90 Capsule 3     Sig: TAKE 1 CAPSULE BY MOUTH DAILY      Last office visit: 10/20/22  Last labs:9/10/19

## 2023-11-11 ENCOUNTER — HOSPITAL ENCOUNTER (OUTPATIENT)
Dept: LAB | Facility: MEDICAL CENTER | Age: 63
End: 2023-11-11
Attending: STUDENT IN AN ORGANIZED HEALTH CARE EDUCATION/TRAINING PROGRAM
Payer: COMMERCIAL

## 2023-11-11 LAB
ALBUMIN SERPL BCP-MCNC: 4.5 G/DL (ref 3.2–4.9)
ALBUMIN/GLOB SERPL: 1.9 G/DL
ALP SERPL-CCNC: 79 U/L (ref 30–99)
ALT SERPL-CCNC: 23 U/L (ref 2–50)
ANION GAP SERPL CALC-SCNC: 12 MMOL/L (ref 7–16)
AST SERPL-CCNC: 22 U/L (ref 12–45)
BASOPHILS # BLD AUTO: 0.8 % (ref 0–1.8)
BASOPHILS # BLD: 0.05 K/UL (ref 0–0.12)
BILIRUB SERPL-MCNC: 0.5 MG/DL (ref 0.1–1.5)
BUN SERPL-MCNC: 14 MG/DL (ref 8–22)
CALCIUM ALBUM COR SERPL-MCNC: 9.3 MG/DL (ref 8.5–10.5)
CALCIUM SERPL-MCNC: 9.7 MG/DL (ref 8.5–10.5)
CHLORIDE SERPL-SCNC: 108 MMOL/L (ref 96–112)
CHOLEST SERPL-MCNC: 185 MG/DL (ref 100–199)
CO2 SERPL-SCNC: 25 MMOL/L (ref 20–33)
CREAT SERPL-MCNC: 0.81 MG/DL (ref 0.5–1.4)
EOSINOPHIL # BLD AUTO: 0.14 K/UL (ref 0–0.51)
EOSINOPHIL NFR BLD: 2.2 % (ref 0–6.9)
ERYTHROCYTE [DISTWIDTH] IN BLOOD BY AUTOMATED COUNT: 43.9 FL (ref 35.9–50)
EST. AVERAGE GLUCOSE BLD GHB EST-MCNC: 103 MG/DL
FASTING STATUS PATIENT QL REPORTED: NORMAL
FOLATE SERPL-MCNC: 13 NG/ML
GFR SERPLBLD CREATININE-BSD FMLA CKD-EPI: 99 ML/MIN/1.73 M 2
GLOBULIN SER CALC-MCNC: 2.4 G/DL (ref 1.9–3.5)
GLUCOSE SERPL-MCNC: 91 MG/DL (ref 65–99)
HBA1C MFR BLD: 5.2 % (ref 4–5.6)
HCT VFR BLD AUTO: 47.8 % (ref 42–52)
HCV AB SER QL: REACTIVE
HDLC SERPL-MCNC: 65 MG/DL
HGB BLD-MCNC: 16.4 G/DL (ref 14–18)
HIV 1+2 AB+HIV1 P24 AG SERPL QL IA: NORMAL
IMM GRANULOCYTES # BLD AUTO: 0.04 K/UL (ref 0–0.11)
IMM GRANULOCYTES NFR BLD AUTO: 0.6 % (ref 0–0.9)
LDLC SERPL CALC-MCNC: 87 MG/DL
LYMPHOCYTES # BLD AUTO: 2.1 K/UL (ref 1–4.8)
LYMPHOCYTES NFR BLD: 33.1 % (ref 22–41)
MCH RBC QN AUTO: 34.5 PG (ref 27–33)
MCHC RBC AUTO-ENTMCNC: 34.3 G/DL (ref 32.3–36.5)
MCV RBC AUTO: 100.6 FL (ref 81.4–97.8)
MONOCYTES # BLD AUTO: 0.44 K/UL (ref 0–0.85)
MONOCYTES NFR BLD AUTO: 6.9 % (ref 0–13.4)
NEUTROPHILS # BLD AUTO: 3.57 K/UL (ref 1.82–7.42)
NEUTROPHILS NFR BLD: 56.4 % (ref 44–72)
NRBC # BLD AUTO: 0 K/UL
NRBC BLD-RTO: 0 /100 WBC (ref 0–0.2)
PLATELET # BLD AUTO: 263 K/UL (ref 164–446)
PMV BLD AUTO: 10.8 FL (ref 9–12.9)
POTASSIUM SERPL-SCNC: 3.7 MMOL/L (ref 3.6–5.5)
PROT SERPL-MCNC: 6.9 G/DL (ref 6–8.2)
RBC # BLD AUTO: 4.75 M/UL (ref 4.7–6.1)
SODIUM SERPL-SCNC: 145 MMOL/L (ref 135–145)
TRIGL SERPL-MCNC: 165 MG/DL (ref 0–149)
TSH SERPL DL<=0.005 MIU/L-ACNC: 2.19 UIU/ML (ref 0.38–5.33)
VIT B12 SERPL-MCNC: 307 PG/ML (ref 211–911)
WBC # BLD AUTO: 6.3 K/UL (ref 4.8–10.8)

## 2023-11-11 PROCEDURE — 85025 COMPLETE CBC W/AUTO DIFF WBC: CPT

## 2023-11-11 PROCEDURE — 86592 SYPHILIS TEST NON-TREP QUAL: CPT

## 2023-11-11 PROCEDURE — 82607 VITAMIN B-12: CPT

## 2023-11-11 PROCEDURE — 86803 HEPATITIS C AB TEST: CPT

## 2023-11-11 PROCEDURE — 87491 CHLMYD TRACH DNA AMP PROBE: CPT

## 2023-11-11 PROCEDURE — 86780 TREPONEMA PALLIDUM: CPT

## 2023-11-11 PROCEDURE — 87389 HIV-1 AG W/HIV-1&-2 AB AG IA: CPT

## 2023-11-11 PROCEDURE — 82746 ASSAY OF FOLIC ACID SERUM: CPT

## 2023-11-11 PROCEDURE — 36415 COLL VENOUS BLD VENIPUNCTURE: CPT

## 2023-11-11 PROCEDURE — 87591 N.GONORRHOEAE DNA AMP PROB: CPT

## 2023-11-11 PROCEDURE — 80053 COMPREHEN METABOLIC PANEL: CPT

## 2023-11-11 PROCEDURE — 83036 HEMOGLOBIN GLYCOSYLATED A1C: CPT

## 2023-11-11 PROCEDURE — 84443 ASSAY THYROID STIM HORMONE: CPT

## 2023-11-11 PROCEDURE — 80061 LIPID PANEL: CPT

## 2023-11-11 PROCEDURE — 87522 HEPATITIS C REVRS TRNSCRPJ: CPT

## 2023-11-12 LAB
C TRACH DNA SPEC QL NAA+PROBE: NEGATIVE
N GONORRHOEA DNA SPEC QL NAA+PROBE: NEGATIVE
SPECIMEN SOURCE: NORMAL

## 2023-11-13 LAB — RPR SER QL: NON REACTIVE

## 2023-11-14 LAB
HCV RNA SERPL NAA+PROBE-ACNC: NOT DETECTED IU/ML
HCV RNA SERPL NAA+PROBE-LOG IU: NOT DETECTED LOG IU/ML
HCV RNA SERPL QL NAA+PROBE: NOT DETECTED

## 2023-11-15 LAB — T PALLIDUM AB SER QL AGGL: NON REACTIVE

## 2024-04-02 ENCOUNTER — APPOINTMENT (OUTPATIENT)
Dept: RADIOLOGY | Facility: IMAGING CENTER | Age: 64
End: 2024-04-02
Attending: FAMILY MEDICINE
Payer: COMMERCIAL

## 2024-04-02 ENCOUNTER — OFFICE VISIT (OUTPATIENT)
Dept: URGENT CARE | Facility: CLINIC | Age: 64
End: 2024-04-02
Payer: COMMERCIAL

## 2024-04-02 VITALS
HEART RATE: 89 BPM | HEIGHT: 69 IN | BODY MASS INDEX: 25.5 KG/M2 | TEMPERATURE: 97.5 F | DIASTOLIC BLOOD PRESSURE: 84 MMHG | WEIGHT: 172.18 LBS | RESPIRATION RATE: 14 BRPM | OXYGEN SATURATION: 93 % | SYSTOLIC BLOOD PRESSURE: 136 MMHG

## 2024-04-02 DIAGNOSIS — Z87.898 HISTORY OF SOLITARY PULMONARY NODULE: ICD-10-CM

## 2024-04-02 DIAGNOSIS — F17.200 SMOKER: ICD-10-CM

## 2024-04-02 DIAGNOSIS — R07.89 CHEST DISCOMFORT: ICD-10-CM

## 2024-04-02 PROCEDURE — 3075F SYST BP GE 130 - 139MM HG: CPT | Performed by: FAMILY MEDICINE

## 2024-04-02 PROCEDURE — 99214 OFFICE O/P EST MOD 30 MIN: CPT | Performed by: FAMILY MEDICINE

## 2024-04-02 PROCEDURE — 71046 X-RAY EXAM CHEST 2 VIEWS: CPT | Mod: TC | Performed by: FAMILY MEDICINE

## 2024-04-02 PROCEDURE — 3079F DIAST BP 80-89 MM HG: CPT | Performed by: FAMILY MEDICINE

## 2024-04-02 ASSESSMENT — FIBROSIS 4 INDEX: FIB4 SCORE: 1.1

## 2024-04-02 NOTE — PROGRESS NOTES
Subjective     Deandre Barr is a 64 y.o. male who presents with Other (Pt requesting referral)      - This is a very pleasant 64 y.o. who has come to the walk-in clinic today for needing a referral to pulmonology.  Says about a decade ago he was found to have incidentally of pulmonary nodule on the left lower lung he believes and he thinks it was just 1.  States he had it monitored for years and he was supposed to have it continually monitored.  Last time he had a checked was about 8 years ago and says back then it had been unchanged.    Says he is interested in getting a referral to pulmonology because over the past year he has been getting episodes 2 or 3 times a week of feeling some pain in the left lower chest area that he feels might be related to his pulmonary nodule.  Says the pain usually lasts an hour or so and goes away.  He says it is not exertional and not associated with nausea vomiting shortness of breath diaphoresis.  He feels the pain is more may be food and eating triggered.  Last time he had this pain with his this morning at work and lasted for about an hour.          ALLERGIES:  Patient has no known allergies.     PMH:  History reviewed. No pertinent past medical history.     PSH:  History reviewed. No pertinent surgical history.    MEDS:    Current Outpatient Medications:     DEXILANT 60 MG CAPSULE DELAYED RELEASE delayed-release capsule, TAKE 1 CAPSULE BY MOUTH DAILY, Disp: 90 Capsule, Rfl: 3    dicyclomine (BENTYL) 10 MG Cap, TAKE ONE CAPSULE BY MOUTH FOUR TIMES DAILY( BEFORE MEALS AND AT BEDTIME), Disp: 360 Capsule, Rfl: 3    Pramoxine-HC (HYDROCORTISONE ACE-PRAMOXINE) 2.5-1 % Cream, Apply one gram to affected skin three times a day as needed., Disp: 28.4 g, Rfl: 5    naproxen (NAPROSYN) 500 MG Tab, TAKE 1 TABLET BY MOUTH TWICE DAILY WITH MEALS (Patient not taking: Reported on 4/2/2024), Disp: 60 Tablet, Rfl: 2    ** I have documented what I find to be significant in regards to past  "medical, social, family and surgical history  in my HPI or under PMH/PSH/FH review section, otherwise it is noncontributory **         HPI    Review of Systems   Cardiovascular:  Positive for chest pain.   All other systems reviewed and are negative.             Objective     /84   Pulse 89   Temp 36.4 °C (97.5 °F) (Temporal)   Resp 14   Ht 1.753 m (5' 9\")   Wt 78.1 kg (172 lb 2.9 oz)   SpO2 93%   BMI 25.43 kg/m²      Physical Exam  Vitals and nursing note reviewed.   Constitutional:       General: He is not in acute distress.     Appearance: Normal appearance. He is well-developed.   HENT:      Head: Normocephalic.   Cardiovascular:      Rate and Rhythm: Normal rate and regular rhythm.      Heart sounds: Normal heart sounds. No murmur heard.  Pulmonary:      Effort: Pulmonary effort is normal. No respiratory distress.      Breath sounds: Normal breath sounds.   Abdominal:      Tenderness: There is no abdominal tenderness. There is no guarding.   Neurological:      Mental Status: He is alert.      Motor: No abnormal muscle tone.   Psychiatric:         Mood and Affect: Mood normal.         Behavior: Behavior normal.                             Assessment & Plan     1. Smoker  DX-CHEST-2 VIEWS    Referral to Pulmonary and Sleep Medicine      2. Chest discomfort  DX-CHEST-2 VIEWS    EKG - Clinic Performed      3. History of solitary pulmonary nodule  Referral to Pulmonary and Sleep Medicine          - Dx, plan & d/c instructions discussed   -Discussed with his episode of left-sided chest discomfort today and being a smoker we always worry about a cardiac etiology.  Discussed with him we have no way of telling or ruling this out here and suggested that he go to the emergency room today to make sure that this is not something cardiac    EKG: NSR 81 w/o acute changes     Follow up with your regular primary care providers office within a week to keep them updated and informed of this visit and for regular " routine health maintenance check-ups.       Patient left in stable condition     Today's radiology imaging personally reviewed by me today on day of visit and Radiology readings reviewed and discussed w/ patient today.      Discussed if any testing, labs or imaging studies are obtained outside of the [facility] facility, it is their responsibility to contact the Urgent Care and let us know that it was done and get us the results so adequate follow up can be initiated    Pertinent prior lab work and/or imaging studies in Epic have been reviewed by me today on day of this visit and taken into account for my treatment and plan today    Pertinent PMH/PSH and/or chronic conditions and medications if any were reviewed today and taken into account for my treatment and plan today    Pertinent prior office visit notes in [facility] have been reviewed by me today on day of this visit.    Please note that this dictation may have been created using voice recognition software, if so I have made every reasonable attempt to correct obvious errors, but I expect that there are errors of grammar and possibly content that I did not discover before finalizing the note.

## 2024-04-15 ENCOUNTER — OFFICE VISIT (OUTPATIENT)
Dept: SLEEP MEDICINE | Facility: MEDICAL CENTER | Age: 64
End: 2024-04-15
Attending: FAMILY MEDICINE
Payer: COMMERCIAL

## 2024-04-15 VITALS
HEART RATE: 84 BPM | BODY MASS INDEX: 25.62 KG/M2 | OXYGEN SATURATION: 96 % | SYSTOLIC BLOOD PRESSURE: 130 MMHG | DIASTOLIC BLOOD PRESSURE: 60 MMHG | HEIGHT: 69 IN | WEIGHT: 173 LBS | RESPIRATION RATE: 15 BRPM

## 2024-04-15 DIAGNOSIS — F17.210 CIGARETTE SMOKER: ICD-10-CM

## 2024-04-15 DIAGNOSIS — Z87.898 HISTORY OF SOLITARY PULMONARY NODULE: ICD-10-CM

## 2024-04-15 PROCEDURE — 99213 OFFICE O/P EST LOW 20 MIN: CPT | Performed by: FAMILY MEDICINE

## 2024-04-15 PROCEDURE — 3075F SYST BP GE 130 - 139MM HG: CPT | Performed by: FAMILY MEDICINE

## 2024-04-15 PROCEDURE — 3078F DIAST BP <80 MM HG: CPT | Performed by: FAMILY MEDICINE

## 2024-04-15 PROCEDURE — 99212 OFFICE O/P EST SF 10 MIN: CPT | Performed by: FAMILY MEDICINE

## 2024-04-15 ASSESSMENT — PATIENT HEALTH QUESTIONNAIRE - PHQ9: CLINICAL INTERPRETATION OF PHQ2 SCORE: 0

## 2024-04-15 ASSESSMENT — FIBROSIS 4 INDEX: FIB4 SCORE: 1.1

## 2024-04-15 NOTE — LETTER
[facility] Medical Records   [address] [facility] [address] [facility]   [address]  Fax: [phone]   Authorization for Release/Disclosure of   Protected Health Information   Name: ISSA SIDDIQUI : 1960 SSN: [id number]   Address: [place] [address] Phone:    [phone] (home)    I authorize the entity listed below to release/disclose the PHI below to:   [facility]/Afshan Bhat M.D. and Xochitl Walters D.O.   Provider or Entity Name:  [facility]    Address   City, [facility], [facility]   Phone:      Fax:  [phone]   Reason for request: continuity of care   Information to be released:    [  ] LAST COLONOSCOPY,  including any PATH REPORT and follow-up  [  ] LAST FIT/COLOGUARD RESULT [  ] LAST DEXA  [  ] LAST MAMMOGRAM  [  ] LAST PAP  [  ] LAST LABS [  ] RETINA EXAM REPORT  [  ] IMMUNIZATION RECORDS  [ XXX ] Release all imaging   Please mail CD Imaging to [facility] medical records and fax imaging reports to [phone]      [  ] Check here and initial the line next to each item to release ALL health information INCLUDING  _____ Care and treatment for drug and / or alcohol abuse  _____ HIV testing, infection status, or AIDS  _____ Genetic Testing    DATES OF SERVICE OR TIME PERIOD TO BE DISCLOSED: _____________  I understand and acknowledge that:  * This Authorization may be revoked at any time by you in writing, except if your health information has already been used or disclosed.  * Your health information that will be used or disclosed as a result of you signing this authorization could be re-disclosed by the recipient. If this occurs, your re-disclosed health information may no longer be protected by State or Federal laws.  * You may refuse to sign this Authorization. Your refusal will not affect your ability to obtain treatment.  * This Authorization becomes effective upon signing and will  on (date) __________.      If no date is indicated, this Authorization will  one  (1) year from the signature date.    Name: Deandre Barr  Signature: Continuity of care Date:   4/15/2024     PLEASE FAX REQUESTED RECORDS BACK TO: [phone]

## 2024-04-15 NOTE — Clinical Note
Thank you for referring Deandre to the [facility].  I have attached the note from our encounter today for your review. -Dr. Xochitl Walters

## 2024-04-15 NOTE — PROGRESS NOTES
"Subjective:     CC:  Diagnoses of History of solitary pulmonary nodule and Cigarette smoker were pertinent to this visit.    HISTORY OF THE PRESENT ILLNESS: Patient is a 64 y.o. male. This pleasant patient is here today to establish care in the [facility] and discuss pulmonary nodule. His referring provider is is Dr. Ned Damian and his PCP is Dr. Afshan Bhat.    Pulmonary nodule- Deandre is a 64M smoker with a 60 pack year history, hx of hepatitis C and GERD.  He reports that about 10 years ago he was found to have an incidental pulmonary nodule of the left lower lung that was 6mm in size.  He states this was monitored for years and he was told to have it continued to be monitored but his last imaging for the nodule was 8 years ago and stable per his report.  He reports that his lung nodule was first noted after getting a xray at a hospital at [facility] in [place]. His PCP, Dr. Ayala in [place] followed up his imaging with a repeat xray and told Deandre that the nodule did not grow and recommended continued monitoring every year.  He has had no imaging except CXR 4/2/24 within the last 8 years.  He has no Cts on file in [facility] and last CXR was 4/2/24 and showed, \"no pulmonary infiltrates or consolidations are noted.\"      He has smoked for the last 40 years, 1.5 packs per day on average. He reports no significant second hand smoke exposure in the past. 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  He has lived in [place], [place], [facility], [place], and [place].  He has known dust, fumes, solvent, gas and chemical exposures from work exposure.  No known radon, asbestos, TB or radiation exposure.  He has never been hospitalized for respiratory problems or been on a ventilator.  He has no hx of asthma, emphysema, COPD or pneumonia.    He has had no recent fevers, chills, abnormal weight loss.  He " "has been gaining weight recently.  He has no cough, sputum or hemoptysis.  He has no SOB or wheezing.  No nausea, vomiting.  His bowels are regular without diarrhea.  He has had a left sided chest discomfort for the last 1-2 months, with similar symptoms years ago that self resolved until the last 1-2 months.  The last episode of chest discomfort was a couple weeks ago before work.  He took angel seltzer and symptoms resolved within 30 minutes.  The pain was sharp and \"hurt like hell.\"  Pain did not radiate.  No associated nausea or vomiting.  He is not sure what triggered symptoms. He does not drink coffee or spicy foods.   First episode of similar symptoms was at age 17 and symptoms always resolve with angel seltzer.    Allergies: Patient has no known allergies.    Current Outpatient Medications Ordered in Epic   Medication Sig Dispense Refill    DEXILANT 60 MG CAPSULE DELAYED RELEASE delayed-release capsule TAKE 1 CAPSULE BY MOUTH DAILY 90 Capsule 3    dicyclomine (BENTYL) 10 MG Cap TAKE ONE CAPSULE BY MOUTH FOUR TIMES DAILY( BEFORE MEALS AND AT BEDTIME) 360 Capsule 3    Pramoxine-HC (HYDROCORTISONE ACE-PRAMOXINE) 2.5-1 % Cream Apply one gram to affected skin three times a day as needed. 28.4 g 5     No current [facility]-ordered facility-administered medications on file.       History reviewed. No pertinent past medical history.    History reviewed. No pertinent surgical history.    Social History     Tobacco Use    Smoking status: Every Day     Current packs/day: 1.50     Average packs/day: 1.5 packs/day for 40.0 years (60.0 ttl pk-yrs)     Types: Cigarettes    Smokeless tobacco: Former   Vaping Use    Vaping Use: Never used   Substance Use Topics    Alcohol use: Yes    Drug use: Never       Social History     Social History Narrative    Not on file       Family History   Problem Relation Age of Onset    Cancer Sister         uterine cancer    Lung Disease Neg Hx        Health Maintenance: discussed the vaccines that " "he is overdue for and pt declined vaccines today    ROS:   Gen: no fevers/chills  ENT: no bloody nose  Pulm: no sob, no cough  CV:  no palpitations  GI: no nausea/vomiting, no diarrhea  : no dysuria  MSk: no myalgias  Skin: no rash  Neuro: no headaches, no numbness/tingling  Heme/Lymph: no abnormal bleeding      Objective:     Exam: /60 (BP Location: Right arm, Patient Position: Sitting, BP Cuff Size: Adult)   Pulse 84   Resp 15   Ht 1.753 m (5' 9\")   Wt 78.5 kg (173 lb)   SpO2 96%  Body mass index is 25.55 kg/m².    General: Normal appearing. No distress.  HEENT: Normocephalic.    Eyes: Eyes conjunctiva clear lids without ptosis  Neck: Supple. Thyroid is not enlarged.  Pulmonary: Clear to ausculation.  Normal effort. No rales, ronchi, or wheezing.  Cardiovascular: Regular rate and rhythm without murmur.   Abdomen: Soft, nontender, nondistended. Normal bowel sounds.   Neurologic: No gross motor deficits  Lymph: No cervical or supraclavicular lymph nodes are palpable  Skin: Warm and dry.  No obvious lesions.  Musculoskeletal: No extremity cyanosis, clubbing, or edema.  Psych: Normal mood and affect. Alert and oriented. Judgment and insight is normal.      Assessment & Plan:   64 y.o. male with the following -    1. History of solitary pulmonary nodule  Chronic, unclear control.  We reviewed his chest xray from 4/2/24 together today.  I requested his outside imaging from Dr. Jamie Davison and his oncologist.  Lung nodules are not as well seen on xray and CT.  Since he has no recent worsening of symptoms and his previous chest pain has instead improved over the last couple week, I recommended he enroll in lung cancer screening with plan for a low dose CT. We will proceed with a CT-lung cancer screening within the next month with Deandre to return afterwards to review the results with me.  Follow-up and red flag precautions given. Patient expressed understanding and agreement with plan.    2. Cigarette " smoker  We conducted a shared decision-making process using a decision aid. We reviewed benefits and harms of screening, including false positives and potential need for additional diagnostic testing, the possibility of over diagnosis, and total radiation exposure.    We discussed the importance of adhering to annual LDCT screening. We also discussed the impact of comorbities on the patient's the ability or willingness to undergo diagnostic procedure(s) and treatment.    Counseling on the importance of maintaining cigarette smoking abstinence if former smoker; or the importance of smoking cessation if current smoker and, if appropriate, furnishing of information about tobacco cessation interventions.    Based on our discussion, we have decided to begin annual lung cancer screening starting now.    - CT-LUNG CANCER-SCREENING; Future        Return in about 4 weeks (around 5/13/2024) for CT follow-up after CT has been completed.    Please note that this dictation was created using voice recognition software. I have made every reasonable attempt to correct obvious errors, but I expect that there are errors of grammar and possibly content that I did not discover before finalizing the note.

## 2024-04-16 ENCOUNTER — HOSPITAL ENCOUNTER (OUTPATIENT)
Dept: RADIOLOGY | Facility: MEDICAL CENTER | Age: 64
End: 2024-04-16
Attending: NURSE PRACTITIONER
Payer: COMMERCIAL

## 2024-04-16 DIAGNOSIS — R14.0 BLOATING: ICD-10-CM

## 2024-04-16 DIAGNOSIS — F10.10 ETOH ABUSE: ICD-10-CM

## 2024-04-16 DIAGNOSIS — R19.4 CHANGE IN BOWEL HABIT: ICD-10-CM

## 2024-04-16 DIAGNOSIS — Z72.0 TOBACCO ABUSE: ICD-10-CM

## 2024-04-16 DIAGNOSIS — R12 HEARTBURN: ICD-10-CM

## 2024-04-16 DIAGNOSIS — K92.1 HEMATOCHEZIA: ICD-10-CM

## 2024-04-16 DIAGNOSIS — R19.7 DIARRHEA, UNSPECIFIED TYPE: ICD-10-CM

## 2024-04-16 PROCEDURE — 76700 US EXAM ABDOM COMPLETE: CPT

## 2024-04-19 ENCOUNTER — TELEPHONE (OUTPATIENT)
Dept: SLEEP MEDICINE | Facility: MEDICAL CENTER | Age: 64
End: 2024-04-19
Payer: COMMERCIAL

## 2024-04-19 DIAGNOSIS — Z87.898 HISTORY OF SOLITARY PULMONARY NODULE: ICD-10-CM

## 2024-04-19 DIAGNOSIS — F17.210 CIGARETTE SMOKER: ICD-10-CM

## 2024-04-19 NOTE — TELEPHONE ENCOUNTER
Patients wife called stating insurance isn't covering CT Lung Cancer Screening with current CPT Code, they want to know if CPT code can be updated to 67784 so insurance can cover. Please contact wife if we are able to update order and if you have any further questions. Thank you

## 2024-04-22 ENCOUNTER — APPOINTMENT (OUTPATIENT)
Dept: RADIOLOGY | Facility: MEDICAL CENTER | Age: 64
End: 2024-04-22
Attending: FAMILY MEDICINE
Payer: COMMERCIAL

## 2024-04-22 NOTE — TELEPHONE ENCOUNTER
CPT code 67473 is for a CT-cardiac score which is a different test that would not fully visualize the lungs.  I cannot change the CT for CPT code 36066.      However, patient is seeing me for follow-up regarding a solitary nodule, so I can order a CT-thorax without to replace the CT-lung cancer screening order.  I have placed the CT-thorax order for him (CPT 77189).    Message sent to Evangelina to inform patient.    -Dr. Xochitl Walters

## 2024-04-23 ENCOUNTER — HOSPITAL ENCOUNTER (OUTPATIENT)
Dept: RADIOLOGY | Facility: MEDICAL CENTER | Age: 64
End: 2024-04-23
Payer: COMMERCIAL

## 2024-05-03 ENCOUNTER — HOSPITAL ENCOUNTER (OUTPATIENT)
Dept: RADIOLOGY | Facility: MEDICAL CENTER | Age: 64
End: 2024-05-03
Attending: FAMILY MEDICINE
Payer: COMMERCIAL

## 2024-05-03 DIAGNOSIS — F17.210 CIGARETTE SMOKER: ICD-10-CM

## 2024-05-03 DIAGNOSIS — Z87.898 HISTORY OF SOLITARY PULMONARY NODULE: ICD-10-CM

## 2024-05-11 ENCOUNTER — HOSPITAL ENCOUNTER (OUTPATIENT)
Dept: LAB | Facility: MEDICAL CENTER | Age: 64
End: 2024-05-11
Attending: NURSE PRACTITIONER
Payer: COMMERCIAL

## 2024-05-11 LAB
BUN SERPL-MCNC: 7 MG/DL (ref 8–22)
CREAT SERPL-MCNC: 0.69 MG/DL (ref 0.5–1.4)
GFR SERPLBLD CREATININE-BSD FMLA CKD-EPI: 104 ML/MIN/1.73 M 2

## 2024-05-20 ENCOUNTER — HOSPITAL ENCOUNTER (OUTPATIENT)
Dept: RADIOLOGY | Facility: MEDICAL CENTER | Age: 64
End: 2024-05-20
Attending: NURSE PRACTITIONER
Payer: COMMERCIAL

## 2024-05-20 ENCOUNTER — TELEPHONE (OUTPATIENT)
Dept: SLEEP MEDICINE | Facility: MEDICAL CENTER | Age: 64
End: 2024-05-20
Payer: COMMERCIAL

## 2024-05-20 DIAGNOSIS — Z78.9 ALCOHOL USE: ICD-10-CM

## 2024-05-20 DIAGNOSIS — K70.30 ALCOHOLIC CIRRHOSIS, UNSPECIFIED WHETHER ASCITES PRESENT (HCC): ICD-10-CM

## 2024-05-20 RX ADMIN — IOHEXOL 100 ML: 350 INJECTION, SOLUTION INTRAVENOUS at 08:53

## 2024-05-20 RX ADMIN — IOHEXOL 25 ML: 240 INJECTION, SOLUTION INTRATHECAL; INTRAVASCULAR; INTRAVENOUS; ORAL at 08:53

## 2024-06-02 ENCOUNTER — HOSPITAL ENCOUNTER (EMERGENCY)
Facility: MEDICAL CENTER | Age: 64
End: 2024-06-02
Attending: EMERGENCY MEDICINE
Payer: COMMERCIAL

## 2024-06-02 ENCOUNTER — APPOINTMENT (OUTPATIENT)
Dept: RADIOLOGY | Facility: MEDICAL CENTER | Age: 64
End: 2024-06-02
Attending: EMERGENCY MEDICINE
Payer: COMMERCIAL

## 2024-06-02 VITALS
DIASTOLIC BLOOD PRESSURE: 78 MMHG | RESPIRATION RATE: 14 BRPM | TEMPERATURE: 99.1 F | HEIGHT: 69 IN | BODY MASS INDEX: 25.18 KG/M2 | OXYGEN SATURATION: 92 % | SYSTOLIC BLOOD PRESSURE: 131 MMHG | HEART RATE: 93 BPM | WEIGHT: 170 LBS

## 2024-06-02 DIAGNOSIS — R19.7 NAUSEA VOMITING AND DIARRHEA: ICD-10-CM

## 2024-06-02 DIAGNOSIS — R11.2 NAUSEA VOMITING AND DIARRHEA: ICD-10-CM

## 2024-06-02 DIAGNOSIS — E86.0 DEHYDRATION: ICD-10-CM

## 2024-06-02 LAB
ALBUMIN SERPL BCP-MCNC: 4.8 G/DL (ref 3.2–4.9)
ALBUMIN/GLOB SERPL: 1.5 G/DL
ALP SERPL-CCNC: 120 U/L (ref 30–99)
ALT SERPL-CCNC: 15 U/L (ref 2–50)
ANION GAP SERPL CALC-SCNC: 20 MMOL/L (ref 7–16)
AST SERPL-CCNC: 27 U/L (ref 12–45)
BASOPHILS # BLD AUTO: 0.2 % (ref 0–1.8)
BASOPHILS # BLD: 0.03 K/UL (ref 0–0.12)
BILIRUB SERPL-MCNC: 0.8 MG/DL (ref 0.1–1.5)
BUN SERPL-MCNC: 19 MG/DL (ref 8–22)
CALCIUM ALBUM COR SERPL-MCNC: 10 MG/DL (ref 8.5–10.5)
CALCIUM SERPL-MCNC: 10.6 MG/DL (ref 8.5–10.5)
CHLORIDE SERPL-SCNC: 107 MMOL/L (ref 96–112)
CO2 SERPL-SCNC: 15 MMOL/L (ref 20–33)
CREAT SERPL-MCNC: 1.59 MG/DL (ref 0.5–1.4)
EOSINOPHIL # BLD AUTO: 0.04 K/UL (ref 0–0.51)
EOSINOPHIL NFR BLD: 0.2 % (ref 0–6.9)
ERYTHROCYTE [DISTWIDTH] IN BLOOD BY AUTOMATED COUNT: 43.6 FL (ref 35.9–50)
FLUAV RNA SPEC QL NAA+PROBE: NEGATIVE
FLUBV RNA SPEC QL NAA+PROBE: NEGATIVE
GFR SERPLBLD CREATININE-BSD FMLA CKD-EPI: 48 ML/MIN/1.73 M 2
GLOBULIN SER CALC-MCNC: 3.3 G/DL (ref 1.9–3.5)
GLUCOSE SERPL-MCNC: 104 MG/DL (ref 65–99)
HCT VFR BLD AUTO: 57.6 % (ref 42–52)
HGB BLD-MCNC: 20.4 G/DL (ref 14–18)
IMM GRANULOCYTES # BLD AUTO: 0.16 K/UL (ref 0–0.11)
IMM GRANULOCYTES NFR BLD AUTO: 0.9 % (ref 0–0.9)
LYMPHOCYTES # BLD AUTO: 0.54 K/UL (ref 1–4.8)
LYMPHOCYTES NFR BLD: 3 % (ref 22–41)
MAGNESIUM SERPL-MCNC: 2.1 MG/DL (ref 1.5–2.5)
MCH RBC QN AUTO: 34.2 PG (ref 27–33)
MCHC RBC AUTO-ENTMCNC: 35.4 G/DL (ref 32.3–36.5)
MCV RBC AUTO: 96.5 FL (ref 81.4–97.8)
MONOCYTES # BLD AUTO: 0.81 K/UL (ref 0–0.85)
MONOCYTES NFR BLD AUTO: 4.5 % (ref 0–13.4)
NEUTROPHILS # BLD AUTO: 16.51 K/UL (ref 1.82–7.42)
NEUTROPHILS NFR BLD: 91.2 % (ref 44–72)
NRBC # BLD AUTO: 0 K/UL
NRBC BLD-RTO: 0 /100 WBC (ref 0–0.2)
PLATELET # BLD AUTO: 315 K/UL (ref 164–446)
PMV BLD AUTO: 10.3 FL (ref 9–12.9)
POTASSIUM SERPL-SCNC: 4.1 MMOL/L (ref 3.6–5.5)
PROT SERPL-MCNC: 8.1 G/DL (ref 6–8.2)
RBC # BLD AUTO: 5.97 M/UL (ref 4.7–6.1)
RSV RNA SPEC QL NAA+PROBE: NEGATIVE
SARS-COV-2 RNA RESP QL NAA+PROBE: NOTDETECTED
SODIUM SERPL-SCNC: 142 MMOL/L (ref 135–145)
SPECIMEN SOURCE: NORMAL
WBC # BLD AUTO: 18.1 K/UL (ref 4.8–10.8)

## 2024-06-02 PROCEDURE — 85025 COMPLETE CBC W/AUTO DIFF WBC: CPT

## 2024-06-02 PROCEDURE — 96374 THER/PROPH/DIAG INJ IV PUSH: CPT

## 2024-06-02 PROCEDURE — 700105 HCHG RX REV CODE 258: Performed by: EMERGENCY MEDICINE

## 2024-06-02 PROCEDURE — 96376 TX/PRO/DX INJ SAME DRUG ADON: CPT

## 2024-06-02 PROCEDURE — 83735 ASSAY OF MAGNESIUM: CPT

## 2024-06-02 PROCEDURE — 99284 EMERGENCY DEPT VISIT MOD MDM: CPT

## 2024-06-02 PROCEDURE — 74177 CT ABD & PELVIS W/CONTRAST: CPT

## 2024-06-02 PROCEDURE — 700117 HCHG RX CONTRAST REV CODE 255: Performed by: EMERGENCY MEDICINE

## 2024-06-02 PROCEDURE — 0241U HCHG SARS-COV-2 COVID-19 NFCT DS RESP RNA 4 TRGT ED POC: CPT

## 2024-06-02 PROCEDURE — 0241U HCHG SARS-COV-2 COVID-19 NFCT DS RESP RNA 4 TRGT MIC: CPT

## 2024-06-02 PROCEDURE — 80053 COMPREHEN METABOLIC PANEL: CPT

## 2024-06-02 PROCEDURE — 700111 HCHG RX REV CODE 636 W/ 250 OVERRIDE (IP): Mod: JZ | Performed by: EMERGENCY MEDICINE

## 2024-06-02 PROCEDURE — 36415 COLL VENOUS BLD VENIPUNCTURE: CPT

## 2024-06-02 RX ORDER — SODIUM CHLORIDE, SODIUM LACTATE, POTASSIUM CHLORIDE, CALCIUM CHLORIDE 600; 310; 30; 20 MG/100ML; MG/100ML; MG/100ML; MG/100ML
1000 INJECTION, SOLUTION INTRAVENOUS ONCE
Status: COMPLETED | OUTPATIENT
Start: 2024-06-02 | End: 2024-06-02

## 2024-06-02 RX ORDER — ONDANSETRON 2 MG/ML
4 INJECTION INTRAMUSCULAR; INTRAVENOUS ONCE
Status: COMPLETED | OUTPATIENT
Start: 2024-06-02 | End: 2024-06-02

## 2024-06-02 RX ORDER — ONDANSETRON 4 MG/1
4 TABLET, ORALLY DISINTEGRATING ORAL EVERY 6 HOURS PRN
Qty: 20 TABLET | Refills: 0 | Status: SHIPPED | OUTPATIENT
Start: 2024-06-02

## 2024-06-02 RX ADMIN — ONDANSETRON 4 MG: 2 INJECTION INTRAMUSCULAR; INTRAVENOUS at 14:24

## 2024-06-02 RX ADMIN — IOHEXOL 100 ML: 350 INJECTION, SOLUTION INTRAVENOUS at 15:52

## 2024-06-02 RX ADMIN — SODIUM CHLORIDE, POTASSIUM CHLORIDE, SODIUM LACTATE AND CALCIUM CHLORIDE 1000 ML: 600; 310; 30; 20 INJECTION, SOLUTION INTRAVENOUS at 14:23

## 2024-06-02 RX ADMIN — SODIUM CHLORIDE, POTASSIUM CHLORIDE, SODIUM LACTATE AND CALCIUM CHLORIDE 1000 ML: 600; 310; 30; 20 INJECTION, SOLUTION INTRAVENOUS at 15:27

## 2024-06-02 RX ADMIN — ONDANSETRON 4 MG: 2 INJECTION INTRAMUSCULAR; INTRAVENOUS at 17:32

## 2024-06-02 ASSESSMENT — LIFESTYLE VARIABLES: DO YOU DRINK ALCOHOL: NO

## 2024-06-02 ASSESSMENT — FIBROSIS 4 INDEX: FIB4 SCORE: 1.1

## 2024-06-02 NOTE — ED PROVIDER NOTES
"ED Provider Note    CHIEF COMPLAINT  Chief Complaint   Patient presents with    Nausea/Vomiting/Diarrhea       EXTERNAL RECORDS REVIEWED  Outpatient Notes outpatient CT from May 20, with some mild colonic thickening, gastric thickening as well    HPI/ROS  LIMITATION TO HISTORY   Select: : None  OUTSIDE HISTORIAN(S):  none    Deandre Barr is a 64 y.o. male who presents with nausea vomiting and diarrhea.  Patient reports that early this morning he awoke and has had multiple episodes of watery diarrhea and vomiting since that point.  No blood in any.  He reports some crampy central lower abdominal pain as well although states that he often gets abdominal pain and sees GI for this with endoscopy scheduled.  He reports no fevers or chills.  No recent travel.  No recent antibiotic use.  No chest pain, cough, congestion or shortness of breath.  No syncope or lightheadedness.    PAST MEDICAL HISTORY   has a past medical history of Liver failure (HCC).    SURGICAL HISTORY  patient denies any surgical history    FAMILY HISTORY  Family History   Problem Relation Age of Onset    Cancer Sister         uterine cancer    Lung Disease Neg Hx        SOCIAL HISTORY  Social History     Tobacco Use    Smoking status: Every Day     Current packs/day: 1.50     Average packs/day: 1.5 packs/day for 40.0 years (60.0 ttl pk-yrs)     Types: Cigarettes    Smokeless tobacco: Former   Vaping Use    Vaping status: Never Used   Substance and Sexual Activity    Alcohol use: Yes    Drug use: Never    Sexual activity: Not on file       CURRENT MEDICATIONS  Home Medications    **Home medications have not yet been reviewed for this encounter**       Audit from Redirected Encounters    **Home medications have not yet been reviewed for this encounter**         ALLERGIES  No Known Allergies    PHYSICAL EXAM  VITAL SIGNS: /67   Pulse 80   Temp 36.2 °C (97.2 °F) (Temporal)   Resp 18   Ht 1.753 m (5' 9\")   Wt 77.1 kg (170 lb)   SpO2 92%   " BMI 25.10 kg/m²      Pulse ox interpretation: I interpret this pulse ox as normal.  Constitutional: Alert   HENT: No signs of trauma, Bilateral external ears normal, Nose normal.   Eyes: Pupils are equal and reactive, Conjunctiva normal, Non-icteric.   Neck: Normal range of motion, No tenderness, Supple, No stridor.   Cardiovascular: Regular rate and rhythm, no murmurs.   Thorax & Lungs: Normal breath sounds, No respiratory distress, No wheezing, No chest tenderness.   Abdomen:  Soft, mild epigastric and left lower quadrant no masses, No pulsatile masses. No peritoneal signs.  Skin: Warm, Dry, No erythema, No rash.   Back: No bony tenderness, No CVA tenderness.   Extremities: Intact distal pulses, No edema, No tenderness, No cyanosis,  Negative Richard's sign.   Musculoskeletal: Good range of motion in all major joints. No tenderness to palpation or major deformities noted.   Neurologic: Alert , Normal motor function, Normal sensory function, No focal deficits noted.   Psychiatric: Affect normal, Judgment normal, Mood normal.               EKG/LABS  Labs Reviewed   CBC WITH DIFFERENTIAL - Abnormal; Notable for the following components:       Result Value    WBC 18.1 (*)     Hemoglobin 20.4 (*)     Hematocrit 57.6 (*)     MCH 34.2 (*)     Neutrophils-Polys 91.20 (*)     Lymphocytes 3.00 (*)     Neutrophils (Absolute) 16.51 (*)     Lymphs (Absolute) 0.54 (*)     Immature Granulocytes (abs) 0.16 (*)     All other components within normal limits   COMP METABOLIC PANEL - Abnormal; Notable for the following components:    Co2 15 (*)     Anion Gap 20.0 (*)     Glucose 104 (*)     Creatinine 1.59 (*)     Calcium 10.6 (*)     Alkaline Phosphatase 120 (*)     All other components within normal limits   ESTIMATED GFR - Abnormal; Notable for the following components:    GFR (CKD-EPI) 48 (*)     All other components within normal limits   MAGNESIUM   COV-2, FLU A/B, AND RSV BY PCR (CEPTicket Surf InternationalID)       I have independently interpreted  this EKG    RADIOLOGY/PROCEDURES   I have independently interpreted the diagnostic imaging associated with this visit and am waiting the final reading from the radiologist.   My preliminary interpretation is as follows: no obstruction      Radiologist interpretation:  CT-ABDOMEN-PELVIS WITH   Final Result      1.  Mildly nodular liver contour.   2.  Mildly prominent upper abdominal lymph nodes.   3.  Mild diffuse gastric rugal thickening which could be due to gastritis.   4.  Skin/subcutaneous nodular lesion overlying the anterior inferior left rib cage. This is indeterminate. It could be sebaceous cysts/epidermal inclusion cyst although other etiologies cannot be excluded. Recommend clinical and exam correlation.          COURSE & MEDICAL DECISION MAKING    ASSESSMENT, COURSE AND PLAN  Care Narrative: 2:08 PM  Patient is evaluated the bedside and chart is reviewed, at this point consideration for dehydration, acute renal insufficiency, electrolyte or metabolic derangement, diverticulitis, colitis, he does have a benign abdominal exam without any findings highly suggestive of obstruction perforation or similar surgical process although these were all considered.  No red flags for enteroinvasive disease/C. difficile.  I have ordered for diagnostic labs, CT, IV fluids and Zofran    Patient is reevaluated, no return of vomiting, he has had 1 cup of water will continue oral challenge.    Patient is reevaluated again, he still had no return of vomiting has had additional water and juice without any more vomiting.  I discussed with him all of his findings, we discussed being admitted to the hospital for continued symptom management hydration, he thinks he can do this at home and will prefer to be discharged    Hydration: Based on the patient's presentation of Acute Diarrhea and Acute Vomiting the patient was given IV fluids. IV Hydration was used because oral hydration was not as rapid as required. Upon recheck following  hydration, the patient was improved.           PROBLEMS MANAGED  # Nausea vomiting and diarrhea.  At this point no findings of emergent process.  He is found to be dehydrated here was given IV fluids as well as tolerating oral fluids.  He has a CT without evidence obstruction perforation or other surgical process.  No findings of colitis fevers or infectious symptoms to necessitate need for antibiotics.  He does have a benign abdominal exam and no red flags for enteroinvasive disease.  His labs do show findings of dehydration with a gap acidosis although again given IV repletion here and taking orals well.  I did discuss potential hospitalization with the patient he believes he can manage this at home which I feel is a reasonable plan        DISPOSITION AND DISCUSSIONS  Barriers to care at this time, including but not limited to:  none .     Decision tools and prescription drugs considered including, but not limited to:  Patient given prescription for Zofran .     The patient will return for new or worsening symptoms and is stable at the time of discharge.    The patient is referred to a primary physician for blood pressure management, diabetic screening, and for all other preventative health concerns.      DISPOSITION:  Patient will be discharged home in stable condition.    FOLLOW UP:  Afshan Bhat M.D.  [address] [place] [id number] [phone]      As needed      OUTPATIENT MEDICATIONS:  New Prescriptions    ONDANSETRON (ZOFRAN ODT) 4 MG TABLET DISPERSIBLE    Take 1 Tablet by mouth every 6 hours as needed for Nausea/Vomiting.         FINAL DIAGNOSIS  1. Nausea vomiting and diarrhea    2. Dehydration           Electronically signed by: Vinh Castro M.D., 6/2/2024 2:07 PM

## 2024-06-02 NOTE — ED NOTES
Blood drawn and sent to lab.  Pt medicated per MAR.   Pt provided with water, okay per ERP.  COVID swab collected and POC in progress.

## 2024-06-02 NOTE — ED NOTES
Pt BIB EMS from home.  Pt complaints of chronic dehydration from chronic diarrhea and vomiting.  Pt following with GI.  Pt had increase in diarrhea and vomiting and EMS was called.  Pt was given 1 liters NC and 12.5 mg of Phenergan IV with improvement in symptoms.  ERP to see.

## 2024-06-03 LAB
FLUAV RNA SPEC QL NAA+PROBE: NEGATIVE
FLUBV RNA SPEC QL NAA+PROBE: NEGATIVE
RSV RNA SPEC QL NAA+PROBE: NEGATIVE
SARS-COV-2 RNA RESP QL NAA+PROBE: NOTDETECTED

## 2024-06-03 NOTE — ED NOTES
Discharge instructions given.  All questions answered.  Prescriptions given x1.  Pt to follow-up with PCP as needed, return to ER if symptoms worsen as discussed.  Pt verbalized understanding.  All belongings with pt.  [facility]

## 2024-06-18 NOTE — PROGRESS NOTES
"Subjective:     CC: lung nodule follow-up    HPI:   Deandre presents today with     Pulmonary nodule- Deandre is a 64M smoker with a 60 pack year history who has hx of hepatitis C and GERD presenting for follow-up regarding his pulmonary nodule.  He was first noted to have an incidental 6mm left lower lung nodule about 10 years ago which was monitored for 8 years and stable per his report.  He established in the [facility] on 4/15/[facility] (see encounter note) and a CT chest was ordered.  On 5/3/24, his CT showed, \"3.2mm nodule within the right major fissure adjacent to the heart border. Most likely fissural lymph node... 6.8mm nodule base of the left lower lobe image 94.\"  Of note, on CT abdomen 11/2015 he had an 8mm left lower lobe nodule.    Since last visit, he had severe diarrhea and vomiting which resolved after recent ER visit.  He has had no recent fevers, chills, abnormal weight loss, chest pain, palpitations, SOB, wheezing, cough, sputum or hemoptysis.  He has no abnormal bleeding.    Past Medical History:   Diagnosis Date    Liver failure (HCC)        Social History     Tobacco Use    Smoking status: Every Day     Current packs/day: 1.50     Average packs/day: 1.5 packs/day for 40.0 years (60.0 ttl pk-yrs)     Types: Cigarettes    Smokeless tobacco: Former   Vaping Use    Vaping status: Never Used   Substance Use Topics    Alcohol use: Yes    Drug use: Never       Current Outpatient Medications Ordered in Epic   Medication Sig Dispense Refill    ondansetron (ZOFRAN ODT) 4 MG TABLET DISPERSIBLE Take 1 Tablet by mouth every 6 hours as needed for Nausea/Vomiting. 20 Tablet 0    DEXILANT 60 MG CAPSULE DELAYED RELEASE delayed-release capsule TAKE 1 CAPSULE BY MOUTH DAILY 90 Capsule 3    Pramoxine-HC (HYDROCORTISONE ACE-PRAMOXINE) 2.5-1 % Cream Apply one gram to affected skin three times a day as needed. 28.4 g 5    dicyclomine (BENTYL) 10 MG Cap TAKE ONE CAPSULE BY MOUTH FOUR TIMES DAILY( BEFORE MEALS AND AT BEDTIME) (Patient " "not taking: Reported on 6/24/2024) 360 Capsule 3     No current Epic-ordered facility-administered medications on file.       Allergies:  Patient has no known allergies.    ROS:  Gen: no fevers/chills, no changes in weight  Pulm: no sob, no cough  CV: no chest pain, no palpitations  GI: no nausea/vomiting  Heme/Lymph: no abnormal bleeding      Objective:       Exam:  /60 (BP Location: Right arm, Patient Position: Sitting, BP Cuff Size: Adult)   Pulse 94   Resp 15   Ht 1.753 m (5' 9\")   Wt 73.9 kg (163 lb)   SpO2 97%   BMI 24.07 kg/m²  Body mass index is 24.07 kg/m².    Gen: Alert and oriented, No apparent distress.  Neck: Neck is supple without lymphadenopathy.  Lungs: Normal effort, CTA bilaterally, no wheezes, rhonchi, or rales  CV: Regular rate and rhythm. No murmurs, rubs, or gallops.  Ext: No clubbing, cyanosis, edema.        Assessment & Plan:     64 y.o. male with the following -     1. Pulmonary nodules  Chronic, improved left lower lobe nodule.  We reviewed his imaging from 11/2015 and 5/3/24 together today.  His left lower lobe nodule measured 8mm 11/2015 and 6.8mm on his most recent imaging 5/3/24.  Given the nodule has decreased in size over the last 8 years, it has behaved benign.  He has a newly noted 3.2mm nodule of the right major fissure on imaging 5/3/24.  We discussed that based on the size and appearance, it is likely benign but we should continue to monitor given his significant smoking history.  Recommended we start routine lung cancer screening with his first screening CT in 1 year (5/4/2025) with  code billed today. Encouraged smoking cessation. Follow-up and red flag precautions given. Patient expressed understanding and agreement with plan.    2. Cigarette smoker  We conducted a shared decision-making process using a decision aid. We reviewed benefits and harms of screening, including false positives and potential need for additional diagnostic testing, the possibility of " over diagnosis, and total radiation exposure.    We discussed the importance of adhering to annual LDCT screening. We also discussed the impact of comorbities on the patient's the ability or willingness to undergo diagnostic procedure(s) and treatment.    Counseling on the importance of maintaining cigarette smoking abstinence if former smoker; or the importance of smoking cessation if current smoker and, if appropriate, furnishing of information about tobacco cessation interventions.    Based on our discussion, we have decided to begin annual lung cancer screening starting now.    - CT-LUNG CANCER-SCREENING; Future    Return in about 1 year (around 6/24/2025) for follow-up regarding lung nodules.    Please note that this dictation was created using voice recognition software. I have made every reasonable attempt to correct obvious errors, but I expect that there are errors of grammar and possibly content that I did not discover before finalizing the note.

## 2024-06-24 ENCOUNTER — OFFICE VISIT (OUTPATIENT)
Dept: SLEEP MEDICINE | Facility: MEDICAL CENTER | Age: 64
End: 2024-06-24
Attending: FAMILY MEDICINE
Payer: COMMERCIAL

## 2024-06-24 VITALS
RESPIRATION RATE: 15 BRPM | BODY MASS INDEX: 24.14 KG/M2 | WEIGHT: 163 LBS | HEART RATE: 94 BPM | HEIGHT: 69 IN | OXYGEN SATURATION: 97 % | SYSTOLIC BLOOD PRESSURE: 130 MMHG | DIASTOLIC BLOOD PRESSURE: 60 MMHG

## 2024-06-24 DIAGNOSIS — F17.210 CIGARETTE SMOKER: ICD-10-CM

## 2024-06-24 DIAGNOSIS — R91.8 PULMONARY NODULES: ICD-10-CM

## 2024-06-24 PROCEDURE — 3078F DIAST BP <80 MM HG: CPT | Performed by: FAMILY MEDICINE

## 2024-06-24 PROCEDURE — 99213 OFFICE O/P EST LOW 20 MIN: CPT | Performed by: FAMILY MEDICINE

## 2024-06-24 PROCEDURE — 99212 OFFICE O/P EST SF 10 MIN: CPT | Performed by: FAMILY MEDICINE

## 2024-06-24 PROCEDURE — 3075F SYST BP GE 130 - 139MM HG: CPT | Performed by: FAMILY MEDICINE

## 2024-06-24 PROCEDURE — G0296 VISIT TO DETERM LDCT ELIG: HCPCS | Performed by: FAMILY MEDICINE

## 2024-06-24 ASSESSMENT — FIBROSIS 4 INDEX: FIB4 SCORE: 1.39

## 2024-06-24 NOTE — Clinical Note
Thank you for referring Deandre to the [facility] Clinic.  I have attached my encounter note for your review. -Dr. Xochitl Walters

## 2024-07-12 ENCOUNTER — HOSPITAL ENCOUNTER (OUTPATIENT)
Facility: MEDICAL CENTER | Age: 64
End: 2024-07-12
Attending: NURSE PRACTITIONER
Payer: COMMERCIAL

## 2024-07-12 ENCOUNTER — OFFICE VISIT (OUTPATIENT)
Dept: URGENT CARE | Facility: CLINIC | Age: 64
End: 2024-07-12
Payer: COMMERCIAL

## 2024-07-12 VITALS
DIASTOLIC BLOOD PRESSURE: 74 MMHG | RESPIRATION RATE: 16 BRPM | WEIGHT: 165.34 LBS | SYSTOLIC BLOOD PRESSURE: 132 MMHG | BODY MASS INDEX: 24.49 KG/M2 | OXYGEN SATURATION: 96 % | TEMPERATURE: 98.7 F | HEIGHT: 69 IN | HEART RATE: 88 BPM

## 2024-07-12 DIAGNOSIS — J02.9 SORE THROAT: ICD-10-CM

## 2024-07-12 DIAGNOSIS — R50.9 LOW GRADE FEVER: ICD-10-CM

## 2024-07-12 DIAGNOSIS — J02.9 PHARYNGITIS, UNSPECIFIED ETIOLOGY: ICD-10-CM

## 2024-07-12 LAB — S PYO DNA SPEC NAA+PROBE: NOT DETECTED

## 2024-07-12 PROCEDURE — 3075F SYST BP GE 130 - 139MM HG: CPT | Performed by: NURSE PRACTITIONER

## 2024-07-12 PROCEDURE — 87651 STREP A DNA AMP PROBE: CPT | Performed by: NURSE PRACTITIONER

## 2024-07-12 PROCEDURE — 3078F DIAST BP <80 MM HG: CPT | Performed by: NURSE PRACTITIONER

## 2024-07-12 PROCEDURE — 99214 OFFICE O/P EST MOD 30 MIN: CPT | Performed by: NURSE PRACTITIONER

## 2024-07-12 PROCEDURE — 87070 CULTURE OTHR SPECIMN AEROBIC: CPT

## 2024-07-12 RX ORDER — IBUPROFEN 800 MG/1
1 TABLET ORAL 2 TIMES DAILY
COMMUNITY

## 2024-07-12 RX ORDER — AMOXICILLIN 875 MG/1
875 TABLET, COATED ORAL 2 TIMES DAILY
Qty: 14 TABLET | Refills: 0 | Status: SHIPPED | OUTPATIENT
Start: 2024-07-12 | End: 2024-07-19

## 2024-07-12 RX ORDER — HYDROCORTISONE ACETATE PRAMOXINE HCL 2.5; 1 G/100G; G/100G
CREAM TOPICAL
COMMUNITY

## 2024-07-12 ASSESSMENT — FIBROSIS 4 INDEX: FIB4 SCORE: 1.39

## 2024-07-14 LAB
BACTERIA SPEC RESP CULT: NORMAL
SIGNIFICANT IND 70042: NORMAL
SITE SITE: NORMAL
SOURCE SOURCE: NORMAL

## 2024-12-12 ENCOUNTER — OFFICE VISIT (OUTPATIENT)
Dept: URGENT CARE | Facility: CLINIC | Age: 64
End: 2024-12-12
Payer: COMMERCIAL

## 2024-12-12 VITALS
WEIGHT: 167.33 LBS | OXYGEN SATURATION: 94 % | HEIGHT: 69 IN | TEMPERATURE: 97.4 F | HEART RATE: 98 BPM | RESPIRATION RATE: 14 BRPM | BODY MASS INDEX: 24.78 KG/M2 | SYSTOLIC BLOOD PRESSURE: 134 MMHG | DIASTOLIC BLOOD PRESSURE: 82 MMHG

## 2024-12-12 DIAGNOSIS — J06.9 URI, ACUTE: ICD-10-CM

## 2024-12-12 LAB
FLUAV RNA SPEC QL NAA+PROBE: NEGATIVE
FLUBV RNA SPEC QL NAA+PROBE: NEGATIVE
RSV RNA SPEC QL NAA+PROBE: NEGATIVE
SARS-COV-2 RNA RESP QL NAA+PROBE: NEGATIVE

## 2024-12-12 PROCEDURE — 3075F SYST BP GE 130 - 139MM HG: CPT

## 2024-12-12 PROCEDURE — 99213 OFFICE O/P EST LOW 20 MIN: CPT

## 2024-12-12 PROCEDURE — 3079F DIAST BP 80-89 MM HG: CPT

## 2024-12-12 PROCEDURE — 0241U POCT CEPHEID COV-2, FLU A/B, RSV - PCR: CPT

## 2024-12-12 ASSESSMENT — ENCOUNTER SYMPTOMS
DIZZINESS: 0
MYALGIAS: 1
SHORTNESS OF BREATH: 0
WEAKNESS: 0
FEVER: 0
BLURRED VISION: 0
VOMITING: 0
SORE THROAT: 0
COUGH: 1
SPUTUM PRODUCTION: 0
HEADACHES: 0
PALPITATIONS: 0
DOUBLE VISION: 0
TINGLING: 0
NAUSEA: 0
CHILLS: 0
DIARRHEA: 0

## 2024-12-12 ASSESSMENT — FIBROSIS 4 INDEX: FIB4 SCORE: 1.39

## 2024-12-12 NOTE — RESULT ENCOUNTER NOTE
Sent patient MyChart message regarding negative viral swab and to continue plan of care that was discussed in clinic.

## 2024-12-12 NOTE — PROGRESS NOTES
Subjective:   Deandre Barr is a 64 y.o. male who presents for Nasal Congestion, Body Aches, and Cough    Patient presents to the clinic for complaints of fatigue, nasal congestion, body aches, and dry cough since Monday.  Patient states he started feeling his symptoms on Monday, went to work, continued to not feel good, and decided to not go to work due to him feeling ill.  Patient states he has a wife with end-stage COPD and has had a life-threatening case of RSV in the past and would like to be swabbed so that he can isolate from his wife if needed.  Has taken no OTC meds for his symptoms.  Patient denies chest pain, shortness of breath, difficulty speaking or swallowing, sore throat, sputum production, or fever/chills.    Cough  Associated symptoms include myalgias. Pertinent negatives include no chest pain, chills, ear pain, fever, headaches, sore throat or shortness of breath.       Review of Systems   Constitutional:  Positive for malaise/fatigue. Negative for chills and fever.   HENT:  Positive for congestion. Negative for ear pain and sore throat.    Eyes:  Negative for blurred vision and double vision.   Respiratory:  Positive for cough. Negative for sputum production and shortness of breath.    Cardiovascular:  Negative for chest pain and palpitations.   Gastrointestinal:  Negative for diarrhea, nausea and vomiting.   Genitourinary:  Negative for dysuria.   Musculoskeletal:  Positive for myalgias.   Neurological:  Negative for dizziness, tingling, weakness and headaches.   All other systems reviewed and are negative.    Refer to HPI for additional details.    During this visit, appropriate PPE was worn, and hand hygiene was performed.    PMH:  has a past medical history of Liver failure (HCC).    MEDS:   Current Outpatient Medications:     hydrocortisone-pramoxine (PERIANAL) 2.5-1 % rectal cream, APPLY A SMALL AMOUTN INTO RECTUM EVERY 8 HOURS FOR 14 DAYS THEN EVERY OTHER DAY AS NEEDED, Disp: , Rfl:     " ibuprofen (MOTRIN) 800 MG Tab, Take 1 Tablet by mouth 2 times a day., Disp: , Rfl:     DEXILANT 60 MG CAPSULE DELAYED RELEASE delayed-release capsule, TAKE 1 CAPSULE BY MOUTH DAILY, Disp: 90 Capsule, Rfl: 3    Pramoxine-HC (HYDROCORTISONE ACE-PRAMOXINE) 2.5-1 % Cream, Apply one gram to affected skin three times a day as needed., Disp: 28.4 g, Rfl: 5    hydrocortisone 2.5 % Cream topical cream, 30 ml (Patient not taking: Reported on 7/12/2024), Disp: , Rfl:     ondansetron (ZOFRAN ODT) 4 MG TABLET DISPERSIBLE, Take 1 Tablet by mouth every 6 hours as needed for Nausea/Vomiting. (Patient not taking: Reported on 7/12/2024), Disp: 20 Tablet, Rfl: 0    dicyclomine (BENTYL) 10 MG Cap, TAKE ONE CAPSULE BY MOUTH FOUR TIMES DAILY( BEFORE MEALS AND AT BEDTIME) (Patient not taking: Reported on 12/12/2024), Disp: 360 Capsule, Rfl: 3    ALLERGIES: No Known Allergies  SURGHX: History reviewed. No pertinent surgical history.  SOCHX:  reports that he has been smoking cigarettes. He has a 60 pack-year smoking history. He has quit using smokeless tobacco. He reports current alcohol use. He reports that he does not use drugs.    FH: Per HPI as applicable/pertinent.    Medications, Allergies, and current problem list reviewed today in Epic.     Objective:     /82   Pulse 98   Temp 36.3 °C (97.4 °F) (Temporal)   Resp 14   Ht 1.753 m (5' 9\")   Wt 75.9 kg (167 lb 5.3 oz)   SpO2 94%     Physical Exam  Constitutional:       Appearance: He is ill-appearing. He is not toxic-appearing.   HENT:      Head: Normocephalic.      Right Ear: Tympanic membrane, ear canal and external ear normal.      Left Ear: Tympanic membrane, ear canal and external ear normal.      Nose: Congestion present. No rhinorrhea.      Mouth/Throat:      Mouth: Mucous membranes are moist.      Pharynx: Oropharynx is clear. No oropharyngeal exudate or posterior oropharyngeal erythema.   Eyes:      General:         Right eye: No discharge.         Left eye: No " discharge.      Extraocular Movements: Extraocular movements intact.      Conjunctiva/sclera: Conjunctivae normal.      Pupils: Pupils are equal, round, and reactive to light.   Cardiovascular:      Rate and Rhythm: Normal rate and regular rhythm.      Pulses: Normal pulses.      Heart sounds: Normal heart sounds. No murmur heard.  Pulmonary:      Effort: Pulmonary effort is normal. No respiratory distress.      Breath sounds: Normal breath sounds. No wheezing or rhonchi.      Comments: Dry cough during visit  Abdominal:      General: Abdomen is flat.   Musculoskeletal:         General: No signs of injury. Normal range of motion.      Cervical back: Normal range of motion. No rigidity.   Lymphadenopathy:      Cervical: No cervical adenopathy.   Skin:     General: Skin is warm and dry.   Neurological:      General: No focal deficit present.      Mental Status: He is alert and oriented to person, place, and time.      Motor: No weakness.         Assessment/Plan:     Diagnosis and associated orders:     1. URI, acute  - POCT CEPHEID COV-2, FLU A/B, RSV - PCR     Comments/MDM:     Discussed with patient the likely etiology of his symptoms is viral URI.  Viral swab collected in clinic.  Patient instructed that he will receive a [employer] message versus phone call regarding the results of the swab and any changes in treatment and/or management as appropriate.  Advised patient to continue OTC pharmacologic management of his symptoms as well as home remedies, including but not limited to plenty of rest and fluids.  Patient agreeable to this plan of care.  All patient questions answered.  RTC in 2 to 3 days if symptoms persist and/or worsen.  Red flag symptoms warranting emergency medical services discussed, including but not limited to fever greater than 103F, chest pain, shortness of breath, difficulty speaking or breathing or swallowing, nausea/vomiting/diarrhea, and palpitations.         Differential diagnosis, natural  history, supportive care, and indications for immediate follow-up discussed.    Advised the patient to follow-up with the primary care physician for recheck, reevaluation, and consideration of further management.    Instructed patient to seek emergency medical attention via calling EMS or going to the Emergency Room for red flag symptoms, including but not limited to: chest pain, palpitations, fever greater than 103F, shortness of breath, wheezing, new or worsened numbness/tingling, focal or unilateral weakness, and bloody vomit/stool.     Please note that this dictation was created using voice recognition software. I have made a reasonable attempt to correct obvious errors, but I expect that there are errors of grammar and possibly content that I did not discover before finalizing the note.    This note was electronically signed by TABATAH Cummins

## 2024-12-26 ENCOUNTER — APPOINTMENT (OUTPATIENT)
Dept: RADIOLOGY | Facility: IMAGING CENTER | Age: 64
End: 2024-12-26
Payer: COMMERCIAL

## 2024-12-26 ENCOUNTER — OFFICE VISIT (OUTPATIENT)
Dept: URGENT CARE | Facility: CLINIC | Age: 64
End: 2024-12-26
Payer: COMMERCIAL

## 2024-12-26 VITALS
TEMPERATURE: 100.4 F | WEIGHT: 166.45 LBS | HEIGHT: 69 IN | DIASTOLIC BLOOD PRESSURE: 68 MMHG | HEART RATE: 92 BPM | OXYGEN SATURATION: 94 % | BODY MASS INDEX: 24.65 KG/M2 | RESPIRATION RATE: 16 BRPM | SYSTOLIC BLOOD PRESSURE: 134 MMHG

## 2024-12-26 DIAGNOSIS — B96.89 BACTERIAL URI: Primary | ICD-10-CM

## 2024-12-26 DIAGNOSIS — R05.1 ACUTE COUGH: ICD-10-CM

## 2024-12-26 DIAGNOSIS — J06.9 BACTERIAL URI: Primary | ICD-10-CM

## 2024-12-26 PROCEDURE — 3075F SYST BP GE 130 - 139MM HG: CPT

## 2024-12-26 PROCEDURE — 71046 X-RAY EXAM CHEST 2 VIEWS: CPT | Mod: TC | Performed by: RADIOLOGY

## 2024-12-26 PROCEDURE — 99214 OFFICE O/P EST MOD 30 MIN: CPT

## 2024-12-26 PROCEDURE — 3078F DIAST BP <80 MM HG: CPT

## 2024-12-26 RX ORDER — DEXTROMETHORPHAN HYDROBROMIDE AND PROMETHAZINE HYDROCHLORIDE 15; 6.25 MG/5ML; MG/5ML
5 SYRUP ORAL
Qty: 118 ML | Refills: 0 | Status: SHIPPED | OUTPATIENT
Start: 2024-12-26

## 2024-12-26 RX ORDER — BENZONATATE 100 MG/1
100 CAPSULE ORAL 3 TIMES DAILY PRN
Qty: 60 CAPSULE | Refills: 0 | Status: SHIPPED | OUTPATIENT
Start: 2024-12-26

## 2024-12-26 RX ORDER — PREDNISONE 10 MG/1
40 TABLET ORAL DAILY
Qty: 20 TABLET | Refills: 0 | Status: SHIPPED | OUTPATIENT
Start: 2024-12-26 | End: 2024-12-31

## 2024-12-26 ASSESSMENT — ENCOUNTER SYMPTOMS
BRUISES/BLEEDS EASILY: 0
BLURRED VISION: 0
FOCAL WEAKNESS: 0
EYE DISCHARGE: 0
MYALGIAS: 1
FEVER: 1
NAUSEA: 0
SORE THROAT: 1
DIARRHEA: 0
ABDOMINAL PAIN: 0
HEMOPTYSIS: 0
SPUTUM PRODUCTION: 1
PALPITATIONS: 0
STRIDOR: 0
CHILLS: 1
WHEEZING: 0
DEPRESSION: 0
SHORTNESS OF BREATH: 0
EYE PAIN: 0
EYE REDNESS: 0
COUGH: 1
PHOTOPHOBIA: 0
DIAPHORESIS: 0
VOMITING: 0
HEADACHES: 1
HEARTBURN: 0
DOUBLE VISION: 0
DIZZINESS: 0

## 2024-12-26 ASSESSMENT — FIBROSIS 4 INDEX: FIB4 SCORE: 1.39

## 2024-12-26 NOTE — PROGRESS NOTES
Subjective:   Deandre Barr is a 64 y.o. male who presents for Cough (Flu like symptoms x 2 weeks, was seen here 2 weeks ago and started feeling better but yesterday the symptoms got worse and had a temp)          I introduced myself to the patient and informed them that I am a Family Nurse Practitioner.    HPI:Deandre is a 64 year-old male who comes in today with c/o sinus pain and pressure, thick green nasal drainage, fever, headache, persistent productive cough, malaise. Onset was over 17 days ago.  He was seen in the urgent care on 12/12/2024, viral panel was negative for COVID flu RSV, he was diagnosed with viral URI at that time, supportive care measures were recommended.  Patient states he did feel better after a week or so for a few days, then started feeling much worse again in the last couple of days with the above symptoms.  Patient describes symptoms as constant. They describe the pain as headache, aching and pressure in the area of the sinuses. Aggravating factors include leaning forward, blowing their nose. Relieving factors include none. Treatments tried at home include states he took a couple of his wife's old prescription with doxycycline which did not help. They describe their symptoms as moderate.  He denies any chest pain, shortness of breath, wheezing, nausea, vomiting, lethargy    Review of Systems   Constitutional:  Positive for chills, fever and malaise/fatigue. Negative for diaphoresis.   HENT:  Positive for congestion and sore throat. Negative for ear discharge, ear pain, hearing loss and tinnitus.    Eyes:  Negative for blurred vision, double vision, photophobia, pain, discharge and redness.   Respiratory:  Positive for cough and sputum production. Negative for hemoptysis, shortness of breath, wheezing and stridor.    Cardiovascular:  Negative for chest pain, palpitations and leg swelling.   Gastrointestinal:  Negative for abdominal pain, diarrhea, heartburn, nausea and vomiting.  "  Genitourinary:  Negative for dysuria.   Musculoskeletal:  Positive for myalgias.   Skin:  Negative for rash.   Neurological:  Positive for headaches. Negative for dizziness and focal weakness.   Endo/Heme/Allergies:  Does not bruise/bleed easily.   Psychiatric/Behavioral:  Negative for depression.    All other systems reviewed and are negative.      Medications: Dexilant Cpdr  dicyclomine Caps  Hydrocortisone Ace-Pramoxine Crea  hydrocortisone Crea  hydrocortisone-pramoxine  ibuprofen Tabs  ondansetron Tbdp     Allergies: Patient has no known allergies.    Problem List: does not have any pertinent problems on file.    Surgical History:  No past surgical history on file.    Past Social Hx:   reports that he has been smoking cigarettes. He has a 60 pack-year smoking history. He has quit using smokeless tobacco. He reports current alcohol use. He reports that he does not use drugs.     Past Family Hx:   family history includes Cancer in his sister.     Problem list, medications, and allergies reviewed by myself today in Epic.   I have documented what I find to be significant in regards to past medical, social, family and surgical history  in my HPI or under PMH/PSH/FH review section, otherwise it is noncontributory     Objective:     /68   Pulse 92   Temp 38 °C (100.4 °F) (Temporal)   Resp 16   Ht 1.753 m (5' 9\")   Wt 75.5 kg (166 lb 7.2 oz)   SpO2 94%   BMI 24.58 kg/m²     During this visit, appropriate PPE was worn, and hand hygiene was performed.    Physical Exam  Vitals reviewed.   Constitutional:       General: He is not in acute distress.     Appearance: Normal appearance. He is not ill-appearing, toxic-appearing or diaphoretic.   HENT:      Head: Normocephalic and atraumatic.      Right Ear: Tympanic membrane, ear canal and external ear normal. There is no impacted cerumen.      Left Ear: Tympanic membrane, ear canal and external ear normal. There is no impacted cerumen.      Nose: Congestion and " rhinorrhea present. Rhinorrhea is purulent.      Right Turbinates: Swollen.      Left Turbinates: Swollen.      Right Sinus: Maxillary sinus tenderness and frontal sinus tenderness present.      Left Sinus: Maxillary sinus tenderness and frontal sinus tenderness present.      Mouth/Throat:      Mouth: Mucous membranes are moist.      Pharynx: Posterior oropharyngeal erythema present. No oropharyngeal exudate.   Eyes:      General: No scleral icterus.        Right eye: No discharge.         Left eye: No discharge.      Extraocular Movements: Extraocular movements intact.      Conjunctiva/sclera: Conjunctivae normal.      Pupils: Pupils are equal, round, and reactive to light.   Cardiovascular:      Rate and Rhythm: Normal rate and regular rhythm.      Heart sounds: Normal heart sounds. No murmur heard.     No friction rub. No gallop.   Pulmonary:      Effort: Pulmonary effort is normal. No respiratory distress.      Breath sounds: Normal breath sounds. No stridor. No wheezing, rhonchi or rales.   Chest:      Chest wall: No tenderness.   Abdominal:      General: Bowel sounds are normal. There is no distension.      Palpations: Abdomen is soft.   Genitourinary:     Comments: Deferred  Musculoskeletal:         General: Normal range of motion.      Cervical back: Normal range of motion. No rigidity or tenderness.   Lymphadenopathy:      Cervical: No cervical adenopathy.   Skin:     General: Skin is warm and dry.      Capillary Refill: Capillary refill takes 2 to 3 seconds.      Coloration: Skin is not jaundiced or pale.   Neurological:      General: No focal deficit present.      Mental Status: He is alert and oriented to person, place, and time. Mental status is at baseline.   Psychiatric:         Mood and Affect: Mood normal.         Behavior: Behavior normal.         Thought Content: Thought content normal.         Judgment: Judgment normal.       X-ray interpreted by myself, normal lung fields, no signs of  pneumonia      RADIOLOGY RESULTS   DX-CHEST-2 VIEWS    Result Date: 12/26/2024 12/26/2024 9:01 AM HISTORY/REASON FOR EXAM:  Cough; Current smoker, cough x 3 weeks, R/O PNA TECHNIQUE/EXAM DESCRIPTION AND NUMBER OF VIEWS: Two views of the chest. COMPARISON:  4/2/2024 FINDINGS: Cardiomediastinal contour is within normal limits. No focal pulmonary consolidation. No pleural fluid collection or pneumothorax. No major bony abnormality is seen.     No acute cardiopulmonary disease.            Assessment/Plan:     Diagnosis and associated orders:     1. Bacterial URI  amoxicillin-clavulanate (AUGMENTIN) 875-125 MG Tab    predniSONE (DELTASONE) 10 MG Tab      2. Acute cough  DX-CHEST-2 VIEWS    benzonatate (TESSALON) 100 MG Cap    promethazine-dextromethorphan (PROMETHAZINE-DM) 6.25-15 MG/5ML syrup    predniSONE (DELTASONE) 10 MG Tab         Comments/MDM:     1. Bacterial URI (Primary)   I informed patient that their presentation and symptoms as well as duration of illness are consistent with a  bacterial upper respiratory infection secondary to viral URI at this time and I discussed the treatment plan with them.  As patient has had a persistent cough for almost 3 weeks, O2 sat is a little low on room air at 94% any does have a fever today of 100.4 I did get a chest x-ray to rule out pneumonia.    I  instructed them regarding supportive care measures-we discussed cough/deep breathing exercises, drink plenty of fluids, get plenty of rest, try a humidifier in bedroom at night to help them sleep, gargle with salt water to help ease sore throat, [employer] bottle sinus wash and warm compresses to sinuses to help with sinus pain.    I instr patient they may use OTC tylenol alternated with ibuprofen for pain/fever - may take tylenol 1000mg every 8 hours, do not exceed 3000 mg in 24 hours; ibuprofen 400 mg every 8 hours if needed, do not exceed 400 mg per dose or 1200 mg in 24 hours.  Patient was instructed that they should start  to feel better after 48 to 72 hours of antibiotics, but to return to clinic if symptoms do not improve or worsen.   Strict ER precautions were given if they get fever that doesn't respond to tylenol/ibuprofen, or any chest pain or difficulty breathing.   Patient was encouraged to get a pharmacy consult when picking up any medication's.  Patient was instructed regarding prednisone, purpose, side effects, precautions, include avoid using any other NSAIDs while taking prednisone, take it first thing in the morning to avoid potential sleeplessness/insomnia, take with food to prevent GI upset.    I did print written instructions for patient, and did go over the diagnosis including differentials, all medications prescribed including purpose, side effects, precautions, and answered their questions to the best of my ability.   Patient states they have good understanding of instructions, and are agreeable with the plan of care.     - amoxicillin-clavulanate (AUGMENTIN) 875-125 MG Tab; Take 1 Tablet by mouth 2 times a day for 7 days.  Dispense: 14 Tablet; Refill: 0  - predniSONE (DELTASONE) 10 MG Tab; Take 4 Tablets by mouth every day for 5 days.  Dispense: 20 Tablet; Refill: 0    2. Acute cough  Patient states cough is troublesome and is asking for something to suppress it, especially during the night when cough is keeping them awake.  Instruct them regarding Tessalon pearls, purpose, side effects, precautions, may take it during daytime, will not make you drowsy;  Instructed patient regarding Promethazine DM for nighttime to help with sleep and cough suppression, purpose, S/E, precautions including the sedating effects of promethazine.  They state they understand, they feel that the benefits at this point will outweigh the risks, would like to go ahead with the prescription as they state they need to get some sleep.     - DX-CHEST-2 VIEWS; Future  - benzonatate (TESSALON) 100 MG Cap; Take 1 Capsule by mouth 3 times a day as  needed for Cough.  Dispense: 60 Capsule; Refill: 0  - promethazine-dextromethorphan (PROMETHAZINE-DM) 6.25-15 MG/5ML syrup; Take 5 mL by mouth at bedtime as needed for Cough. (caution: may cause sedation)  Dispense: 118 mL; Refill: 0  - predniSONE (DELTASONE) 10 MG Tab; Take 4 Tablets by mouth every day for 5 days.  Dispense: 20 Tablet; Refill: 0         Pt is clinically stable at today's acute urgent care visit. Vital signs are normal and reassuring.  No acute distress noted. Appropriate for outpatient management at this time.        I personally reviewed prior external notes and test results pertinent to today's visit.  I have independently reviewed and interpreted all diagnostics ordered during this urgent care acute visit.        Please note that this dictation was created using voice recognition software. I have made a reasonable attempt to correct obvious errors, but I expect that there are errors of grammar and possibly content that I did not discover before finalizing the note.    This note was electronically signed by Ishmael GARNICA, JULIOCESAR, CECILIA, DANA

## 2025-02-16 ENCOUNTER — APPOINTMENT (OUTPATIENT)
Dept: URGENT CARE | Facility: PHYSICIAN GROUP | Age: 65
End: 2025-02-16
Payer: COMMERCIAL

## 2025-03-10 ENCOUNTER — OFFICE VISIT (OUTPATIENT)
Dept: URGENT CARE | Facility: CLINIC | Age: 65
End: 2025-03-10
Payer: COMMERCIAL

## 2025-03-10 VITALS
SYSTOLIC BLOOD PRESSURE: 138 MMHG | TEMPERATURE: 97.8 F | HEIGHT: 69 IN | WEIGHT: 162.7 LBS | RESPIRATION RATE: 16 BRPM | BODY MASS INDEX: 24.1 KG/M2 | DIASTOLIC BLOOD PRESSURE: 86 MMHG | OXYGEN SATURATION: 96 % | HEART RATE: 82 BPM

## 2025-03-10 DIAGNOSIS — H61.21 HEARING LOSS OF RIGHT EAR DUE TO CERUMEN IMPACTION: ICD-10-CM

## 2025-03-10 PROCEDURE — 69210 REMOVE IMPACTED EAR WAX UNI: CPT | Mod: RT | Performed by: NURSE PRACTITIONER

## 2025-03-10 PROCEDURE — 3075F SYST BP GE 130 - 139MM HG: CPT | Performed by: NURSE PRACTITIONER

## 2025-03-10 PROCEDURE — 3079F DIAST BP 80-89 MM HG: CPT | Performed by: NURSE PRACTITIONER

## 2025-03-10 ASSESSMENT — FIBROSIS 4 INDEX: FIB4 SCORE: 1.42

## 2025-03-10 NOTE — PROGRESS NOTES
Verbal consent was acquired by the patient to use [employer] ambient listening note generation during this visit          Chief Complaint   Patient presents with    Ear Fullness     Difficulty hearing out of R Ear          History of Present Illness  The patient is a 65-year-old male who presents for evaluation of right ear hearing loss.    He reports experiencing auditory impairment in his right ear, which he describes as less severe than the hearing loss in his left ear. He also mentions an episode of itching in his left ear the previous night, during which he used a Q-tip for relief. Following this, he experienced complete hearing loss in the left ear throughout the night. However, he notes a slight improvement in his hearing this morning. Additionally, he reports experiencing tinnitus.         ROS:    No severe shortness of breath   No cardiac like chest pain, as discussed   As otherwise stated in HPI    Medical/SX/ Social History:  Reviewed per chart    Pertinent Medications:    Current Outpatient Medications on File Prior to Visit   Medication Sig Dispense Refill    DEXILANT 60 MG CAPSULE DELAYED RELEASE delayed-release capsule TAKE 1 CAPSULE BY MOUTH DAILY 90 Capsule 3    benzonatate (TESSALON) 100 MG Cap Take 1 Capsule by mouth 3 times a day as needed for Cough. (Patient not taking: Reported on 3/10/2025) 60 Capsule 0    promethazine-dextromethorphan (PROMETHAZINE-DM) 6.25-15 MG/5ML syrup Take 5 mL by mouth at bedtime as needed for Cough. (caution: may cause sedation) (Patient not taking: Reported on 3/10/2025) 118 mL 0    hydrocortisone 2.5 % Cream topical cream 30 ml (Patient not taking: Reported on 7/12/2024)      hydrocortisone-pramoxine (PERIANAL) 2.5-1 % rectal cream APPLY A SMALL AMOUTN INTO RECTUM EVERY 8 HOURS FOR 14 DAYS THEN EVERY OTHER DAY AS NEEDED (Patient not taking: Reported on 3/10/2025)      ibuprofen (MOTRIN) 800 MG Tab Take 1 Tablet by mouth 2 times a day. (Patient not taking: Reported  on 3/10/2025)      ondansetron (ZOFRAN ODT) 4 MG TABLET DISPERSIBLE Take 1 Tablet by mouth every 6 hours as needed for Nausea/Vomiting. (Patient not taking: Reported on 7/12/2024) 20 Tablet 0    dicyclomine (BENTYL) 10 MG Cap TAKE ONE CAPSULE BY MOUTH FOUR TIMES DAILY( BEFORE MEALS AND AT BEDTIME) (Patient not taking: Reported on 12/12/2024) 360 Capsule 3    Pramoxine-HC (HYDROCORTISONE ACE-PRAMOXINE) 2.5-1 % Cream Apply one gram to affected skin three times a day as needed. (Patient not taking: Reported on 3/10/2025) 28.4 g 5     No current facility-administered medications on file prior to visit.        Allergies:    Patient has no known allergies.     Problem list, medications, and allergies reviewed by myself today in Epic     Physical Exam:    Vitals:    03/10/25 0908   BP: 138/86   Pulse: 82   Resp: 16   Temp: 36.6 °C (97.8 °F)   SpO2: 96%             Physical Exam  Vitals and nursing note reviewed.   Constitutional:       General: He is not in acute distress.     Appearance: Normal appearance. He is not ill-appearing or toxic-appearing.   HENT:      Head: Normocephalic and atraumatic.      Right Ear: There is impacted cerumen.      Left Ear: Tympanic membrane, ear canal and external ear normal.      Ears:      Comments: Procedure: Cerumen Removal  Risks and benefits of procedure discussed with patient.  Cerumen removed with lavage by the MA. Patient tolerated the procedure well    Post-lavage curette was performed by DANIKA Payan. Post procedure exam with clear canal and normal TM.           Nose: Nose normal.      Mouth/Throat:      Mouth: Mucous membranes are moist.      Pharynx: Oropharynx is clear.   Eyes:      Extraocular Movements: Extraocular movements intact.      Conjunctiva/sclera: Conjunctivae normal.      Pupils: Pupils are equal, round, and reactive to light.   Cardiovascular:      Rate and Rhythm: Normal rate.      Pulses: Normal pulses.   Pulmonary:      Effort: Pulmonary effort is  normal.   Musculoskeletal:         General: Normal range of motion.      Cervical back: Normal range of motion.   Skin:     General: Skin is warm.      Capillary Refill: Capillary refill takes less than 2 seconds.   Neurological:      General: No focal deficit present.      Mental Status: He is alert and oriented to person, place, and time.            Medical Decision making and plan :  I personally reviewed prior external notes and test results pertinent to today's visit. Pt is clinically stable at today's acute urgent care visit.  Patient appears nontoxic with no acute distress noted. Appropriate for outpatient care at this time.    Pleasant 65 y.o. male presented clinic with:     Assessment & Plan  1. Cerumen impaction, right.  Pt educated about proper care of ear canal. Q-tip cleaning discouraged, use of debrox and warm water lavage discussed.      Shared decision-making was utilized with patient for treatment plan. Medication discussed included indication for use and the potential benefits and side effects. Education was provided regarding the aforementioned assessments.  Differential Diagnosis, natural history, and supportive care discussed. All of the patient's questions were answered to their satisfaction at the time of discharge. Patient was encouraged to monitor symptoms closely. Those signs and symptoms which would warrant concern and mandate seeking a higher level of service through the emergency department discussed at length.  Patient stated agreement and understanding of this plan of care.    Disposition:  Home in stable condition     Voice Recognition Disclaimer:  Portions of this document were created using voice recognition software and [employer] technology provided by [employer]. The software does have a chance of producing errors of grammar and possibly content. I have made every reasonable attempt to correct obvious errors, but there may be errors of grammar and possibly content that I did not discover  before finalizing the  documentation.    SARA Ambrocio.

## 2025-04-30 ENCOUNTER — HOSPITAL ENCOUNTER (OUTPATIENT)
Dept: LAB | Facility: MEDICAL CENTER | Age: 65
End: 2025-04-30
Attending: NURSE PRACTITIONER
Payer: COMMERCIAL

## 2025-04-30 ENCOUNTER — HOSPITAL ENCOUNTER (OUTPATIENT)
Dept: RADIOLOGY | Facility: MEDICAL CENTER | Age: 65
End: 2025-04-30
Attending: NURSE PRACTITIONER
Payer: COMMERCIAL

## 2025-04-30 DIAGNOSIS — F10.90 ALCOHOL USE: ICD-10-CM

## 2025-04-30 DIAGNOSIS — R93.5 ABNORMAL ABDOMINAL ULTRASOUND: ICD-10-CM

## 2025-04-30 DIAGNOSIS — K70.30 CIRRHOSIS WITH ALCOHOLISM (HCC): ICD-10-CM

## 2025-04-30 DIAGNOSIS — R14.0 GASTRIC TYMPANY: ICD-10-CM

## 2025-04-30 DIAGNOSIS — Z86.19 HISTORY OF HEPATITIS C: ICD-10-CM

## 2025-04-30 LAB
ALBUMIN SERPL BCP-MCNC: 4.2 G/DL (ref 3.2–4.9)
ALBUMIN/GLOB SERPL: 1.6 G/DL
ALP SERPL-CCNC: 79 U/L (ref 30–99)
ALT SERPL-CCNC: 23 U/L (ref 2–50)
ANION GAP SERPL CALC-SCNC: 11 MMOL/L (ref 7–16)
AST SERPL-CCNC: 25 U/L (ref 12–45)
BASOPHILS # BLD AUTO: 0.6 % (ref 0–1.8)
BASOPHILS # BLD: 0.04 K/UL (ref 0–0.12)
BILIRUB SERPL-MCNC: 0.7 MG/DL (ref 0.1–1.5)
BUN SERPL-MCNC: 15 MG/DL (ref 8–22)
CALCIUM ALBUM COR SERPL-MCNC: 9.2 MG/DL (ref 8.5–10.5)
CALCIUM SERPL-MCNC: 9.4 MG/DL (ref 8.5–10.5)
CHLORIDE SERPL-SCNC: 110 MMOL/L (ref 96–112)
CO2 SERPL-SCNC: 24 MMOL/L (ref 20–33)
CREAT SERPL-MCNC: 0.87 MG/DL (ref 0.5–1.4)
EOSINOPHIL # BLD AUTO: 0.11 K/UL (ref 0–0.51)
EOSINOPHIL NFR BLD: 1.6 % (ref 0–6.9)
ERYTHROCYTE [DISTWIDTH] IN BLOOD BY AUTOMATED COUNT: 43.8 FL (ref 35.9–50)
GFR SERPLBLD CREATININE-BSD FMLA CKD-EPI: 96 ML/MIN/1.73 M 2
GLOBULIN SER CALC-MCNC: 2.7 G/DL (ref 1.9–3.5)
GLUCOSE SERPL-MCNC: 95 MG/DL (ref 65–99)
HCT VFR BLD AUTO: 48 % (ref 42–52)
HGB BLD-MCNC: 16.8 G/DL (ref 14–18)
IMM GRANULOCYTES # BLD AUTO: 0.02 K/UL (ref 0–0.11)
IMM GRANULOCYTES NFR BLD AUTO: 0.3 % (ref 0–0.9)
INR PPP: 1.02 (ref 0.87–1.13)
LYMPHOCYTES # BLD AUTO: 1.92 K/UL (ref 1–4.8)
LYMPHOCYTES NFR BLD: 27.2 % (ref 22–41)
MCH RBC QN AUTO: 34.1 PG (ref 27–33)
MCHC RBC AUTO-ENTMCNC: 35 G/DL (ref 32.3–36.5)
MCV RBC AUTO: 97.4 FL (ref 81.4–97.8)
MONOCYTES # BLD AUTO: 0.49 K/UL (ref 0–0.85)
MONOCYTES NFR BLD AUTO: 7 % (ref 0–13.4)
NEUTROPHILS # BLD AUTO: 4.47 K/UL (ref 1.82–7.42)
NEUTROPHILS NFR BLD: 63.3 % (ref 44–72)
NRBC # BLD AUTO: 0 K/UL
NRBC BLD-RTO: 0 /100 WBC (ref 0–0.2)
PLATELET # BLD AUTO: 290 K/UL (ref 164–446)
PMV BLD AUTO: 11.1 FL (ref 9–12.9)
POTASSIUM SERPL-SCNC: 3.9 MMOL/L (ref 3.6–5.5)
PROT SERPL-MCNC: 6.9 G/DL (ref 6–8.2)
PROTHROMBIN TIME: 13.4 SEC (ref 12–14.6)
RBC # BLD AUTO: 4.93 M/UL (ref 4.7–6.1)
SODIUM SERPL-SCNC: 145 MMOL/L (ref 135–145)
WBC # BLD AUTO: 7.1 K/UL (ref 4.8–10.8)

## 2025-04-30 PROCEDURE — 80053 COMPREHEN METABOLIC PANEL: CPT

## 2025-04-30 PROCEDURE — 36415 COLL VENOUS BLD VENIPUNCTURE: CPT

## 2025-04-30 PROCEDURE — 85025 COMPLETE CBC W/AUTO DIFF WBC: CPT

## 2025-04-30 PROCEDURE — 76705 ECHO EXAM OF ABDOMEN: CPT

## 2025-04-30 PROCEDURE — 85610 PROTHROMBIN TIME: CPT

## 2025-04-30 PROCEDURE — 82105 ALPHA-FETOPROTEIN SERUM: CPT

## 2025-05-02 LAB — AFP-TM SERPL-MCNC: 3 NG/ML (ref 0–9)

## 2025-06-13 ENCOUNTER — OFFICE VISIT (OUTPATIENT)
Dept: URGENT CARE | Facility: CLINIC | Age: 65
End: 2025-06-13
Payer: COMMERCIAL

## 2025-06-13 VITALS
BODY MASS INDEX: 23.48 KG/M2 | OXYGEN SATURATION: 97 % | TEMPERATURE: 98.7 F | WEIGHT: 158.51 LBS | RESPIRATION RATE: 18 BRPM | SYSTOLIC BLOOD PRESSURE: 120 MMHG | HEIGHT: 69 IN | DIASTOLIC BLOOD PRESSURE: 61 MMHG | HEART RATE: 78 BPM

## 2025-06-13 DIAGNOSIS — J98.8 RTI (RESPIRATORY TRACT INFECTION): ICD-10-CM

## 2025-06-13 DIAGNOSIS — R05.2 SUBACUTE COUGH: ICD-10-CM

## 2025-06-13 PROCEDURE — 1126F AMNT PAIN NOTED NONE PRSNT: CPT | Performed by: STUDENT IN AN ORGANIZED HEALTH CARE EDUCATION/TRAINING PROGRAM

## 2025-06-13 PROCEDURE — 3074F SYST BP LT 130 MM HG: CPT | Performed by: STUDENT IN AN ORGANIZED HEALTH CARE EDUCATION/TRAINING PROGRAM

## 2025-06-13 PROCEDURE — 3078F DIAST BP <80 MM HG: CPT | Performed by: STUDENT IN AN ORGANIZED HEALTH CARE EDUCATION/TRAINING PROGRAM

## 2025-06-13 PROCEDURE — 99213 OFFICE O/P EST LOW 20 MIN: CPT | Performed by: STUDENT IN AN ORGANIZED HEALTH CARE EDUCATION/TRAINING PROGRAM

## 2025-06-13 RX ORDER — DOXYCYCLINE HYCLATE 100 MG
100 TABLET ORAL 2 TIMES DAILY
Qty: 14 TABLET | Refills: 0 | Status: SHIPPED | OUTPATIENT
Start: 2025-06-13 | End: 2025-06-20

## 2025-06-13 RX ORDER — DEXTROMETHORPHAN HYDROBROMIDE AND PROMETHAZINE HYDROCHLORIDE 15; 6.25 MG/5ML; MG/5ML
5 SYRUP ORAL
Qty: 118 ML | Refills: 0 | Status: SHIPPED | OUTPATIENT
Start: 2025-06-13

## 2025-06-13 RX ORDER — BENZONATATE 100 MG/1
100-200 CAPSULE ORAL 3 TIMES DAILY PRN
Qty: 60 CAPSULE | Refills: 0 | Status: SHIPPED | OUTPATIENT
Start: 2025-06-13

## 2025-06-13 RX ORDER — METHYLPREDNISOLONE 4 MG/1
TABLET ORAL
Qty: 21 TABLET | Refills: 0 | Status: SHIPPED | OUTPATIENT
Start: 2025-06-13

## 2025-06-13 ASSESSMENT — ENCOUNTER SYMPTOMS
PALPITATIONS: 0
WHEEZING: 0
HEADACHES: 0
SHORTNESS OF BREATH: 0
SINUS PAIN: 1
CHILLS: 0
FEVER: 0
COUGH: 1
DIZZINESS: 0
SPUTUM PRODUCTION: 1
HEMOPTYSIS: 0
SORE THROAT: 0

## 2025-06-13 ASSESSMENT — FIBROSIS 4 INDEX: FIB4 SCORE: 1.15

## 2025-06-13 ASSESSMENT — PAIN SCALES - GENERAL: PAINLEVEL_OUTOF10: NO PAIN

## 2025-06-13 NOTE — PROGRESS NOTES
"Subjective     Deandre Ta Barr is a 65 y.o. male who presents with Cough (3-weeks, congestion and running nose, coughing thick \"tan/brown\" mucus)            Deandre is a 65 y.o. male who presents urgent care with cough.  States he has had cough for 3 weeks.  Cough has been unchanged.  Has also had runny nose and nasal congestion.  Cough is worse at nighttime.  No SOB/wheezing.  Wife has been sick with similar symptoms and was recently diagnosed with pneumonia.  Patient reports no prior history of COPD or asthma.        Review of Systems   Constitutional:  Negative for chills and fever.   HENT:  Positive for congestion and sinus pain. Negative for ear pain and sore throat.    Respiratory:  Positive for cough and sputum production. Negative for hemoptysis, shortness of breath and wheezing.    Cardiovascular:  Negative for chest pain and palpitations.   Neurological:  Negative for dizziness and headaches.   All other systems reviewed and are negative.             Objective     /61 (BP Location: Right arm, Patient Position: Sitting, BP Cuff Size: Adult)   Pulse 78   Temp 37.1 °C (98.7 °F) (Temporal)   Resp 18   Ht 1.753 m (5' 9\")   Wt 71.9 kg (158 lb 8.2 oz)   SpO2 97%   BMI 23.41 kg/m²      Physical Exam  Vitals reviewed.   Constitutional:       General: He is not in acute distress.     Appearance: Normal appearance. He is not ill-appearing, toxic-appearing or diaphoretic.   HENT:      Head: Normocephalic and atraumatic.      Left Ear: Tympanic membrane normal.      Nose: Congestion and rhinorrhea present.      Mouth/Throat:      Lips: Pink.      Mouth: Mucous membranes are moist.      Pharynx: Oropharynx is clear. Uvula midline.   Eyes:      Extraocular Movements: Extraocular movements intact.      Conjunctiva/sclera: Conjunctivae normal.      Pupils: Pupils are equal, round, and reactive to light.   Cardiovascular:      Rate and Rhythm: Normal rate.   Pulmonary:      Effort: Pulmonary effort is normal. " No respiratory distress.      Breath sounds: Normal breath sounds. No stridor. No wheezing, rhonchi or rales.   Skin:     General: Skin is warm and dry.   Neurological:      General: No focal deficit present.      Mental Status: He is alert and oriented to person, place, and time.                                  Assessment & Plan  RTI (respiratory tract infection)    Orders:    doxycycline (VIBRAMYCIN) 100 MG Tab; Take 1 Tablet by mouth 2 times a day for 7 days.    methylPREDNISolone (MEDROL DOSEPAK) 4 MG Tablet Therapy Pack; Follow schedule on package instructions.    Subacute cough    Orders:    benzonatate (TESSALON) 100 MG Cap; Take 1-2 Capsules by mouth 3 times a day as needed for Cough.    promethazine-dextromethorphan (PROMETHAZINE-DM) 6.25-15 MG/5ML syrup; Take 5 mL by mouth after meals and at bedtime as needed for Cough.    methylPREDNISolone (MEDROL DOSEPAK) 4 MG Tablet Therapy Pack; Follow schedule on package instructions.             Differential diagnoses, supportive care measures and indications for immediate follow-up discussed with patient. Pathogenesis of diagnosis discussed including typical length and natural progression.      Instructed to return to urgent care or nearest emergency department if symptoms fail to improve, for any change in condition, further concerns, or new concerning symptoms.    Patient states understanding and agrees with the plan of care and discharge instructions. \

## 2025-08-29 ENCOUNTER — OFFICE VISIT (OUTPATIENT)
Dept: DERMATOLOGY | Facility: IMAGING CENTER | Age: 65
End: 2025-08-29
Payer: COMMERCIAL

## 2025-08-29 DIAGNOSIS — L72.0 EPIDERMAL INCLUSION CYST: ICD-10-CM

## 2025-08-29 DIAGNOSIS — D48.5 NEOPLASM OF UNCERTAIN BEHAVIOR OF SKIN: ICD-10-CM
